# Patient Record
Sex: FEMALE | Race: WHITE | Employment: FULL TIME | URBAN - METROPOLITAN AREA
[De-identification: names, ages, dates, MRNs, and addresses within clinical notes are randomized per-mention and may not be internally consistent; named-entity substitution may affect disease eponyms.]

---

## 2017-03-30 ENCOUNTER — TELEPHONE (OUTPATIENT)
Dept: ONCOLOGY | Age: 57
End: 2017-03-30

## 2017-05-03 ENCOUNTER — HOSPITAL ENCOUNTER (OUTPATIENT)
Dept: MAMMOGRAPHY | Age: 57
Discharge: HOME | End: 2017-05-03
Attending: FAMILY MEDICINE
Payer: COMMERCIAL

## 2017-05-03 DIAGNOSIS — Z09 FOLLOW-UP EXAM, 3-6 MONTHS SINCE PREVIOUS EXAM: ICD-10-CM

## 2017-05-03 DIAGNOSIS — R92.8 ABNORMAL MAMMOGRAM: ICD-10-CM

## 2017-05-03 DIAGNOSIS — R92.8 FOLLOW-UP EXAMINATION OF ABNORMAL MAMMOGRAM: ICD-10-CM

## 2017-05-03 PROCEDURE — 76642 ULTRASOUND BREAST LIMITED: CPT

## 2017-05-03 PROCEDURE — 77061 BREAST TOMOSYNTHESIS UNI: CPT

## 2017-05-08 ENCOUNTER — TELEPHONE (OUTPATIENT)
Dept: FAMILY MEDICINE CLINIC | Age: 57
End: 2017-05-08

## 2017-05-08 NOTE — TELEPHONE ENCOUNTER
Call pt to advise her right breast 3D mammo and US remain stable. No changes.   FU for bilateral mammogram in Nov.

## 2017-05-12 ENCOUNTER — TELEPHONE (OUTPATIENT)
Dept: FAMILY MEDICINE CLINIC | Age: 57
End: 2017-05-12

## 2017-05-31 ENCOUNTER — OFFICE VISIT (OUTPATIENT)
Dept: FAMILY MEDICINE CLINIC | Age: 57
End: 2017-05-31

## 2017-05-31 VITALS
HEIGHT: 66 IN | TEMPERATURE: 98.2 F | WEIGHT: 148 LBS | HEART RATE: 63 BPM | BODY MASS INDEX: 23.78 KG/M2 | SYSTOLIC BLOOD PRESSURE: 91 MMHG | OXYGEN SATURATION: 98 % | RESPIRATION RATE: 20 BRPM | DIASTOLIC BLOOD PRESSURE: 56 MMHG

## 2017-05-31 DIAGNOSIS — Z11.59 NEED FOR HEPATITIS C SCREENING TEST: ICD-10-CM

## 2017-05-31 DIAGNOSIS — Z00.00 WELL WOMAN EXAM (NO GYNECOLOGICAL EXAM): Primary | ICD-10-CM

## 2017-05-31 DIAGNOSIS — Z12.11 SCREEN FOR COLON CANCER: ICD-10-CM

## 2017-05-31 NOTE — MR AVS SNAPSHOT
Visit Information Date & Time Provider Department Dept. Phone Encounter #  
 [id number]  1:08 PM Ginna AmadoSudhir [phone] [id number] Upcoming Health Maintenance Date Due Hepatitis C Screening 1960 FOBT Q 1 YEAR AGE 50-75 10/23/2012 INFLUENZA AGE 9 TO ADULT 8/1/2017 PAP AKA CERVICAL CYTOLOGY 2/23/2018 Pneumococcal 19-64 Highest Risk (2 of 3 - PCV13) 5/31/2018 BREAST CANCER SCRN MAMMOGRAM 5/3/2019 DTaP/Tdap/Td series (2 - Td) 4/15/2025 Allergies as of 5/31/2017  Review Complete On: 6/37/4536 By: Ginna Amado MD  
  
 Severity Noted Reaction Type Reactions Latex High 02/23/2015   Side Effect Rash Pcn [Penicillins] High 02/23/2015   Side Effect Rash Stadol [Butorphanol Tartrate] High 02/23/2015   Side Effect Other (comments) Sees things Sterapred [Prednisone] High 02/23/2015   Side Effect Other (comments) depression Sulfa (Sulfonamide Antibiotics) High 02/23/2015    Other (comments) septic Current Immunizations  Reviewed on 8/31/2016 No immunizations on file. Not reviewed this visit You Were Diagnosed With   
  
 Codes Comments Well woman exam (no gynecological exam)    -  Primary ICD-10-CM: Z00.00 ICD-9-CM: V70.0 [V70.0] Screen for colon cancer     ICD-10-CM: Z12.11 ICD-9-CM: V76.51 Need for hepatitis C screening test     ICD-10-CM: Z11.59 
ICD-9-CM: V73.89 Vitals BP Pulse Temp Resp Height(growth percentile) Weight(growth percentile) 91/56 (BP 1 Location: Right arm, BP Patient Position: Sitting) 63 98.2 °F (36.8 °C) (Oral) 20 5' 6\" (1.676 m) 148 lb (67.1 kg) SpO2 BMI OB Status Smoking Status 98% 23.89 kg/m2 Chemically Induced Never Smoker BMI and BSA Data Body Mass Index Body Surface Area  
 23.89 kg/m 2 1.77 m 2 Preferred Pharmacy Pharmacy Name Phone  [email or web address] LORRAINE AMARJIT, [address] [place] [address],[address] [place] [address] [phone] Your Updated Medication List  
  
   
This list is accurate as of: 5/31/17  5:11 PM.  Always use your most recent med list.  
  
  
  
  
 multivitamin tablet Commonly known as:  ONE A DAY Take 1 Tab by mouth daily. * OTHER Take  by mouth daily. Indications: EPAmax * OTHER Indications: Tucker-Mag Plus VITAMIN D3 4,000 unit Cap Generic drug:  cholecalciferol (vitamin D3) Take  by mouth. * Notice: This list has 2 medication(s) that are the same as other medications prescribed for you. Read the directions carefully, and ask your doctor or other care provider to review them with you. We Performed the Following HEMOGLOBIN A1C WITH EAG [95200 CPT(R)] HEPATITIS C AB [78763 CPT(R)] LIPID PANEL [68867 CPT(R)] OCCULT BLOOD, IMMUNOASSAY (FIT) W621546 CPT(R)] T4, FREE [id number] CPT(R)] TSH 3RD GENERATION [32499 CPT(R)] To-Do List   
 10/24/2017 4:30 PM  
(Arrive by 4:15 PM) Appointment with [facility] 1 at [facility] ([phone]) Shower or bathe using soap and water. Do not use deodorant, powder, perfumes, or lotion the day of your exam.  If your prior mammograms were not performed at [facility] 6 please bring films with you or forward prior images 2 days before your procedure. Check in at registration 15min before your appointment time unless you were instructed to do otherwise. A script is not necessary for screening. If you have one, please bring it on the day of the mammogram or have it faxed to the department. [facility]  [phone] [facility] [phone] [facility] [phone] [facility] [phone] Please arrive 15 minutes prior to appointment to register Patient Instructions Colon Cancer Screening: Care Instructions Your Care Instructions Colorectal cancer occurs in the colon or rectum. That's the lower part of your digestive system.  It is the second-leading cause of cancer deaths in the [place]. It often starts with small growths called polyps in the colon or rectum. Polyps are usually found with screening tests. Depending on the type of test, any polyps found may be removed during the tests. Colorectal cancer usually does not cause symptoms at first. But regular tests can help find it early, before it spreads and becomes harder to treat. Experts advise routine tests for colon cancer for people starting at age 48. And they advise people with a higher risk of colon cancer to get tested sooner. Talk with your doctor about when you should start testing. Discuss which tests you need. Follow-up care is a key part of your treatment and safety. Be sure to make and go to all appointments, and call your doctor if you are having problems. It's also a good idea to know your test results and keep a list of the medicines you take. What are the main screening tests for colon cancer? · Stool tests. These include the fecal immunochemical test (FIT) and the fecal occult blood test (FOBT). These tests check stool samples for signs of cancer. If your test is positive, you will need to have a colonoscopy. · Sigmoidoscopy. This test lets your doctor look at the lining of your rectum and the lowest part of your colon. Your doctor uses a lighted tube called a sigmoidoscope. This test can't find cancers or polyps in the upper part of your colon. In some cases, polyps that are found can be removed. But if your doctor finds polyps, you will need to have a colonoscopy to check the upper part of your colon. · Colonoscopy. This test lets your doctor look at the lining of your rectum and your entire colon. The doctor uses a thin, flexible tool called a colonoscope. It can also be used to remove polyps or get a tissue sample (biopsy). What tests do you need? The following guidelines are for people age 48 and over who are not at high risk for colorectal cancer.  You may have at least one of these tests as directed by your doctor. · Fecal immunochemical test (FIT) or fecal occult blood test (FOBT) every year · Sigmoidoscopy every 5 years · Colonoscopy every 10 years If you are age 76 and have had regular screenings or are age [de-identified] or older, you may not need screening. Talk with your doctor about when you need to be tested. And discuss which tests are right for you. Your doctor may recommend earlier or more frequent testing if you: 
· Have had colorectal cancer before. · Have had colon polyps. · Have symptoms of colorectal cancer. These include blood in your stool and changes in your bowel habits. · Have a parent, brother or sister, or child with colon polyps or colorectal cancer. · Have a bowel disease. This includes ulcerative colitis and Crohn's disease. · Have a rare polyp syndrome that runs in families, such as familial adenomatous polyposis (FAP). · Have had radiation treatments to the belly or pelvis. When should you call for help? Call your doctor now or seek immediate medical care if: 
· Your stools are black and tarlike or have streaks of blood. · You have pain or tenderness in your lower belly. Watch closely for changes in your health, and be sure to contact your doctor if: 
· You have diarrhea, constipation, or another change in bowel habits that does not get better. · Your stools are narrower than usual. 
· You lose weight and you don't know why. · You have any questions about your screening tests or schedule. Where can you learn more? Go to http://willa-madison.info/. Enter M541 in the search box to learn more about \"Colon Cancer Screening: Care Instructions. \" Current as of: July 26, 2016 Content Version: 11.2 © 5580-6472 [employer]. Care instructions adapted under license by [employer] (which disclaims liability or warranty for this information).  If you have questions about a medical condition or this instruction, always ask your healthcare professional. [facility] any warranty or liability for your use of this information. Introducing [facility]! Dev Culver introduces CleveX patient portal. Now you can access parts of your medical record, email your doctor's office, and request medication refills online. 1. In your internet browser, go to [email or web address] 2. Click on the First Time User? Click Here link in the Sign In box. You will see the New Member Sign Up page. 3. Enter your CleveX Access Code exactly as it appears below. You will not need to use this code after youve completed the sign-up process. If you do not sign up before the expiration date, you must request a new code. · CleveX Access Code: [email or web address] Expires: 7/27/2017 12:25 PM 
 
4. Enter the last four digits of your Social Security Number (xxxx) and Date of Birth (mm/dd/yyyy) as indicated and click Submit. You will be taken to the next sign-up page. 5. Create a CleveX ID. This will be your CleveX login ID and cannot be changed, so think of one that is secure and easy to remember. 6. Create a CleveX password. You can change your password at any time. 7. Enter your Password Reset Question and Answer. This can be used at a later time if you forget your password. 8. Enter your e-mail address. You will receive e-mail notification when new information is available in [address] 9. Click Sign Up. You can now view and download portions of your medical record. 10. Click the Download Summary menu link to download a portable copy of your medical information. If you have questions, please visit the Frequently Asked Questions section of the CleveX website. Remember, CleveX is NOT to be used for urgent needs. For medical emergencies, dial 911. Now available from your iPhone and Android! Please provide this summary of care documentation to your next provider. Your primary care clinician is listed as Francesca Pressley. If you have any questions after today's visit, please call [phone].

## 2017-05-31 NOTE — PATIENT INSTRUCTIONS
Colon Cancer Screening: Care Instructions  Your Care Instructions  Colorectal cancer occurs in the colon or rectum. That's the lower part of your digestive system. It is the second-leading cause of cancer deaths in the [place]. It often starts with small growths called polyps in the colon or rectum. Polyps are usually found with screening tests. Depending on the type of test, any polyps found may be removed during the tests. Colorectal cancer usually does not cause symptoms at first. But regular tests can help find it early, before it spreads and becomes harder to treat. Experts advise routine tests for colon cancer for people starting at age 48. And they advise people with a higher risk of colon cancer to get tested sooner. Talk with your doctor about when you should start testing. Discuss which tests you need. Follow-up care is a key part of your treatment and safety. Be sure to make and go to all appointments, and call your doctor if you are having problems. It's also a good idea to know your test results and keep a list of the medicines you take. What are the main screening tests for colon cancer? · Stool tests. These include the fecal immunochemical test (FIT) and the fecal occult blood test (FOBT). These tests check stool samples for signs of cancer. If your test is positive, you will need to have a colonoscopy. · Sigmoidoscopy. This test lets your doctor look at the lining of your rectum and the lowest part of your colon. Your doctor uses a lighted tube called a sigmoidoscope. This test can't find cancers or polyps in the upper part of your colon. In some cases, polyps that are found can be removed. But if your doctor finds polyps, you will need to have a colonoscopy to check the upper part of your colon. · Colonoscopy. This test lets your doctor look at the lining of your rectum and your entire colon. The doctor uses a thin, flexible tool called a colonoscope.  It can also be used to remove polyps or get a tissue sample (biopsy). What tests do you need? The following guidelines are for people age 48 and over who are not at high risk for colorectal cancer. You may have at least one of these tests as directed by your doctor. · Fecal immunochemical test (FIT) or fecal occult blood test (FOBT) every year  · Sigmoidoscopy every 5 years  · Colonoscopy every 10 years  If you are age 76 and have had regular screenings or are age [de-identified] or older, you may not need screening. Talk with your doctor about when you need to be tested. And discuss which tests are right for you. Your doctor may recommend earlier or more frequent testing if you:  · Have had colorectal cancer before. · Have had colon polyps. · Have symptoms of colorectal cancer. These include blood in your stool and changes in your bowel habits. · Have a parent, brother or sister, or child with colon polyps or colorectal cancer. · Have a bowel disease. This includes ulcerative colitis and Crohn's disease. · Have a rare polyp syndrome that runs in families, such as familial adenomatous polyposis (FAP). · Have had radiation treatments to the belly or pelvis. When should you call for help? Call your doctor now or seek immediate medical care if:  · Your stools are black and tarlike or have streaks of blood. · You have pain or tenderness in your lower belly. Watch closely for changes in your health, and be sure to contact your doctor if:  · You have diarrhea, constipation, or another change in bowel habits that does not get better. · Your stools are narrower than usual.  · You lose weight and you don't know why. · You have any questions about your screening tests or schedule. Where can you learn more? Go to http://willa-madison.info/. Enter M541 in the search box to learn more about \"Colon Cancer Screening: Care Instructions. \"  Current as of: July 26, 2016  Content Version: 11.2  © 6723-8538 [employer], [facility].  Care instructions adapted under license by [employer] (which disclaims liability or warranty for this information). If you have questions about a medical condition or this instruction, always ask your healthcare professional. [address] any warranty or liability for your use of this information.

## 2017-05-31 NOTE — PROGRESS NOTES
Subjective:   49 y.o. female for Well Woman Check. Her gyne and breast care is done elsewhere by her Ob-Gyne physician. Patient Active Problem List    Diagnosis Date Noted    Breast cancer, stage 2 ([facility] [address] 08/31/2016    History of lumpectomy of left breast 08/31/2016    Iritis, chronic 08/31/2016    Cancer ([facility]      Current Outpatient Prescriptions   Medication Sig Dispense Refill    multivitamin (ONE A DAY) tablet Take 1 Tab by mouth daily.  cholecalciferol, vitamin D3, (VITAMIN D3) 4,000 unit cap Take  by mouth.  OTHER Take  by mouth daily. Indications: EPAmax      OTHER Indications: Tucker-Mag Plus       Allergies   Allergen Reactions    Latex Rash    Pcn [Penicillins] Rash    Stadol [Butorphanol Tartrate] Other (comments)     Sees things    Sterapred [Prednisone] Other (comments)     depression    Sulfa (Sulfonamide Antibiotics) Other (comments)     septic     Past Medical History:   Diagnosis Date    Acute iritis of left eye     Breast cancer ([facility]     left idc 1+ node    Cancer ([facility] 2009    breast cancer     Past Surgical History:   Procedure Laterality Date    HX ACL RECONSTRUCTION Right 2002    HX BREAST LUMPECTOMY Left 2010    idc 1 + node, chemo only    HX COLONOSCOPY  2014    normal    [facility] BIOPSY BREAST STEREOTACTIC Left     idc 2010     Family History   Problem Relation Age of Onset    Cancer Mother      melanoma    Lupus Father      Social History   Substance Use Topics    Smoking status: Never Smoker    Smokeless tobacco: Never Used    Alcohol use No             Specific concerns today: pt had labs done ordered by ONCOLOGY. Up to date on mammogram. Last PAP 2015. Feeling well. Review of Systems  Pertinent items are noted in HPI. Objective:   Blood pressure 91/56, pulse 63, temperature 98.2 °F (36.8 °C), temperature source Oral, resp. rate 20, height 5' 6\" (1.676 m), weight 148 lb (67.1 kg), SpO2 98 %.    Physical Examination:   General appearance - alert, well appearing, and in no distress  Mental status - alert, oriented to person, place, and time  Eyes - pupils equal and reactive, extraocular eye movements intact  Ears - bilateral TM's and external ear canals normal  Nose - normal and patent, no erythema, discharge or polyps  Mouth - mucous membranes moist, pharynx normal without lesions  Neck - supple, no significant adenopathy  Chest - clear to auscultation, no wheezes, rales or rhonchi, symmetric air entry  Heart - normal rate, regular rhythm, normal S1, S2, no murmurs, rubs, clicks or gallops  Abdomen - soft, nontender, nondistended, no masses or organomegaly  Neurological - alert, oriented, normal speech, no focal findings or movement disorder noted  Extremities - peripheral pulses normal, no pedal edema, no clubbing or cyanosis     Assessment/Plan:   Well woman exam  increase physical activity, follow low fat diet, follow low salt diet, routine labs ordered    ICD-10-CM ICD-9-CM    1. Well woman exam (no gynecological exam) Z00.00 V70.0 LIPID PANEL      TSH 3RD GENERATION      T4, FREE      HEMOGLOBIN A1C WITH EAG    [V70.0]   2. Screen for colon cancer Z12.11 V76.51 OCCULT BLOOD, IMMUNOASSAY (FIT)   3. Need for hepatitis C screening test Z11.59 V73.89 HEPATITIS C AB     She plans to return for fasting labs after end of school year. Cont get regular exercise.

## 2017-05-31 NOTE — PROGRESS NOTES
Identified pt with two pt identifiers(name and ). Chief Complaint   Patient presents with    Physical        Health Maintenance Due   Topic    Hepatitis C Screening     FOBT Q 1 YEAR AGE 50-75        Wt Readings from Last 3 Encounters:   17 148 lb (67.1 kg)   16 145 lb 1.6 oz (65.8 kg)   02/23/15 157 lb (71.2 kg)     Temp Readings from Last 3 Encounters:   17 98.2 °F (36.8 °C) (Oral)   02/23/15 98.4 °F (36.9 °C) (Oral)     BP Readings from Last 3 Encounters:   17 91/56   16 105/68   02/23/15 102/64     Pulse Readings from Last 3 Encounters:   17 63   16 67   02/23/15 (!) 57         Learning Assessment:  :     Learning Assessment 2015   PRIMARY LEARNER Patient   HIGHEST LEVEL OF EDUCATION - PRIMARY LEARNER  > 4 YEARS OF COLLEGE   BARRIERS PRIMARY LEARNER NONE   CO-LEARNER CAREGIVER No   PRIMARY LANGUAGE ENGLISH   LEARNER PREFERENCE PRIMARY READING     LISTENING   ANSWERED BY self   RELATIONSHIP SELF       Depression Screening:  :     PHQ over the last two weeks 2017   Little interest or pleasure in doing things Not at all   Feeling down, depressed or hopeless Not at all   Total Score PHQ 2 0       Fall Risk Assessment:  :     Fall Risk Assessment, last 12 mths 2017   Able to walk? Yes   Fall in past 12 months? No       Abuse Screening:  :     Abuse Screening Questionnaire 2017   Do you ever feel afraid of your partner? N N   Are you in a relationship with someone who physically or mentally threatens you? N N   Is it safe for you to go home? Y Y       Coordination of Care Questionnaire:  :     1) Have you been to an emergency room, urgent care clinic since your last visit? no   Hospitalized since your last visit? no             2) Have you seen or consulted any other health care providers outside of [address] [place] [address] since your last visit?  yes  oncology (Include any pap smears or colon screenings in this section.)    3) Do you have an Advance Directive on file? no  Are you interested in receiving information about Advance Directives? no    Reviewed record in preparation for visit and have obtained necessary documentation. Medication reconciliation up to date and corrected with patient at this time.

## 2017-10-24 ENCOUNTER — HOSPITAL ENCOUNTER (OUTPATIENT)
Dept: MAMMOGRAPHY | Age: 57
Discharge: HOME | End: 2017-10-24
Attending: FAMILY MEDICINE
Payer: COMMERCIAL

## 2017-10-24 DIAGNOSIS — Z12.31 VISIT FOR SCREENING MAMMOGRAM: ICD-10-CM

## 2017-10-24 PROCEDURE — 77063 BREAST TOMOSYNTHESIS BI: CPT

## 2017-11-22 ENCOUNTER — OFFICE VISIT (OUTPATIENT)
Dept: ONCOLOGY | Age: 57
End: 2017-11-22

## 2017-11-22 VITALS
RESPIRATION RATE: 16 BRPM | SYSTOLIC BLOOD PRESSURE: 108 MMHG | BODY MASS INDEX: 22.43 KG/M2 | HEART RATE: 53 BPM | DIASTOLIC BLOOD PRESSURE: 69 MMHG | OXYGEN SATURATION: 98 % | WEIGHT: 139.6 LBS | TEMPERATURE: 98.4 F | HEIGHT: 66 IN

## 2017-11-22 DIAGNOSIS — Z98.890 HISTORY OF LUMPECTOMY OF LEFT BREAST: ICD-10-CM

## 2017-11-22 DIAGNOSIS — C50.912 MALIGNANT NEOPLASM OF LEFT BREAST, STAGE 2 (HCC): Primary | ICD-10-CM

## 2017-11-22 NOTE — PROGRESS NOTES
HEME/ONC CONSULT       Jarret Aragon is a 56 y.o. 1960 female and presents with Breast Cancer    CC  Breast cancer 2010    HPI  Pt seen today for over a year f/u breast cancer not on any therapy from here. Last seen 8/16 per pt preference. Has a rash with hive like lesions and pt is concerned about cancer recurrence. mammo 11/17 good. Pt did not do PCP labs because states she does not need that. Wants labs with tumor markers from here ordered. Pt states she has Sana and Masha awareness of left LNs\". No new issues today. Sees PCP regularly. Last visit:  office consult for ER neg UT+ HEr2 neg breast cancer in 2010 treated at [facility]. Pt had lumpectomy declined XRT and had TC x 6. Last mammo 11/14 and due for mammo. Had insurance issues which delayed. No breast MRI since surgery. Pt is healthy but has had chronic recurrent iritis. Pt associates this with cancer occurrence. Pt has autoimmune w/u neg. Pt is here to establish routine f/u. No cancer related issues today. Family hx of breast cancer in aunt. Mother melanoma. Father lupus. DX   Encounter Diagnoses   Name Primary?     Malignant neoplasm of left breast, stage 2 ([address] Yes    History of lumpectomy of left breast         STAGE: 2 ER neg/ UT + Her 2 neg    TREATMENT COURSE: lumpectomy declined XRT  [facility]        Past Medical History:   Diagnosis Date    Acute iritis of left eye     Breast cancer ([address]     left idc 1+ node    Cancer ([address] 2009    breast cancer     Past Surgical History:   Procedure Laterality Date    HX ACL RECONSTRUCTION Right 2002    HX BREAST LUMPECTOMY Left 2010    idc 1 + node, chemo only    HX COLONOSCOPY  2014    normal    LUIS ALBERTO BIOPSY BREAST STEREOTACTIC Left 2010    idc      Social History     Social History    Marital status:      Spouse name: N/A    Number of children: N/A    Years of education: N/A     Social History Main Topics    Smoking status: Never Smoker    Smokeless tobacco: Never Used    Alcohol use No    Drug use: No    Sexual activity: Yes     Partners: Male     Other Topics Concern    None     Social History Narrative     Family History   Problem Relation Age of Onset    Cancer Mother      melanoma    Lupus Father        Current Outpatient Prescriptions   Medication Sig Dispense Refill    multivitamin (ONE A DAY) tablet Take 1 Tab by mouth daily.  cholecalciferol, vitamin D3, (VITAMIN D3) 4,000 unit cap Take  by mouth.  OTHER Take  by mouth daily. Indications: EPAmax      OTHER Indications: Tucker-Mag Plus         Allergies   Allergen Reactions    Latex Rash    Pcn [Penicillins] Rash    Stadol [Butorphanol Tartrate] Other (comments)     Sees things    Sterapred [Prednisone] Other (comments)     depression    Sulfa (Sulfonamide Antibiotics) Other (comments)     septic       Review of Systems    A comprehensive review of systems was negative except for: per HPI       Objective:  Visit Vitals    /69    Pulse (!) 53    Temp 98.4 °F (36.9 °C) (Oral)    Resp 16    Ht 5' 6\" (1.676 m)    Wt 139 lb 9.6 oz (63.3 kg)    SpO2 98%    BMI 22.53 kg/m2         Physical Exam:   General appearance - alert, well appearing, and in no distress, oriented to person, place, and time and normal appearing weight  Mental status - alert, oriented to person, place, and time, normal mood, behavior, speech, dress, motor activity, and thought processes  EYE-conj clear  Mouth - mucous membranes moist, pharynx normal without lesions  Neck - supple, no significant adenopathy   Chest - clear to auscultation, no wheezes, rales or rhonchi, symmetric air entry   Heart - normal rate and regular rhythm   Abdomen - soft, nontender, nondistended, no masses or organomegaly  Lymph- no adenopathy palpable  Ext-no pedal edema noted  Skin-Warm and dry.    Neuro -alert, oriented, normal speech, no focal findings or movement disorder noted  Breast - no masses palapated b/l    Diagnostic Imaging   reviewed  No results found for this or any previous visit. No results found for this or any previous visit. Lab Results  reviewed  Lab Results   Component Value Date/Time    WBC 4.8 03/30/2017 12:00 AM    HGB 12.9 03/30/2017 12:00 AM    HCT 38.8 03/30/2017 12:00 AM    PLATELET 890 60/16/8719 12:00 AM    MCV 89 03/30/2017 12:00 AM       Lab Results   Component Value Date/Time    Sodium 141 03/30/2017 12:00 AM    Potassium 4.3 03/30/2017 12:00 AM    Chloride 99 03/30/2017 12:00 AM    CO2 28 03/30/2017 12:00 AM    Glucose 93 03/30/2017 12:00 AM    BUN 14 03/30/2017 12:00 AM    Creatinine 0.75 03/30/2017 12:00 AM    BUN/Creatinine ratio 19 03/30/2017 12:00 AM    GFR est  03/30/2017 12:00 AM    GFR est non-AA 91 03/30/2017 12:00 AM    Calcium 9.5 03/30/2017 12:00 AM    AST (SGOT) 27 03/30/2017 12:00 AM    Alk. phosphatase 54 03/30/2017 12:00 AM    Protein, total 7.2 03/30/2017 12:00 AM    Albumin 4.6 03/30/2017 12:00 AM    A-G Ratio 1.8 03/30/2017 12:00 AM    ALT (SGPT) 26 03/30/2017 12:00 AM       .    Assessment/Plan:    1. Stage 2 left breast cancer 2010 ER neg/ DC + Her2 neg s/p lump no radiation by choice and TC chemo x 6 at [facility]     No evidence of recurrent breast cancer throughout the years. Pt is not on any therapy from here. mammo good 11/7. Pt did not do PCP labs. Wants routine labs done here with tumor markers as she has had done in the past.   We again discussed difficulties with interpreting tumor markers in early stage breast cancer but pt had them done at [facility] in past.    Pt clinically is doing well overall with no cancer related issues today. Routine yearly fu here is fine. 2. Rash. Pt to see PCP for this. 3.. Question about pin worms in family member. Pt to call PCP about this. F/u here in a year. Call if questions.                ICD-10-CM ICD-9-CM 1. Malignant neoplasm of left breast, stage 2 (HCC) C50.912 174.9 CBC WITH AUTOMATED DIFF      METABOLIC PANEL, COMPREHENSIVE      CA 27.29      CA 15-3 (SERIAL MONITOR)      LUIS ALBERTO 3D RONN W MAMMO BI SCREENING INCL CAD   2. History of lumpectomy of left breast Z90.12 V45.89 CBC WITH AUTOMATED DIFF      METABOLIC PANEL, COMPREHENSIVE      CA 27.29      CA 15-3 (SERIAL MONITOR)      LUIS ALBERTO 3D RONN W MAMMO BI SCREENING INCL CAD       Current Outpatient Prescriptions   Medication Sig    multivitamin (ONE A DAY) tablet Take 1 Tab by mouth daily.  cholecalciferol, vitamin D3, (VITAMIN D3) 4,000 unit cap Take  by mouth.  OTHER Take  by mouth daily. Indications: EPAmax    OTHER Indications: Tucker-Mag Plus     No current facility-administered medications for this visit. continue present plan, routine labs ordered, call if any problems  There are no Patient Instructions on file for this visit. Follow-up Disposition:  Return in about 1 year (around 11/22/2018).     Melissa Aguero DO

## 2018-06-11 LAB
ALBUMIN SERPL-MCNC: 4.7 G/DL (ref 3.5–5.5)
ALBUMIN/GLOB SERPL: 2.4 {RATIO} (ref 1.2–2.2)
ALP SERPL-CCNC: 54 IU/L (ref 39–117)
ALT SERPL-CCNC: 24 IU/L (ref 0–32)
AST SERPL-CCNC: 31 IU/L (ref 0–40)
BASOPHILS # BLD AUTO: 0 X10E3/UL (ref 0–0.2)
BASOPHILS NFR BLD AUTO: 1 %
BILIRUB SERPL-MCNC: 0.6 MG/DL (ref 0–1.2)
BUN SERPL-MCNC: 17 MG/DL (ref 6–24)
BUN/CREAT SERPL: 20 (ref 9–23)
CALCIUM SERPL-MCNC: 9.4 MG/DL (ref 8.7–10.2)
CANCER AG15-3 SERPL-ACNC: 15.6 U/ML (ref 0–25)
CANCER AG27-29 SERPL-ACNC: 20.3 U/ML (ref 0–38.6)
CHLORIDE SERPL-SCNC: 101 MMOL/L (ref 96–106)
CO2 SERPL-SCNC: 26 MMOL/L (ref 18–29)
CREAT SERPL-MCNC: 0.85 MG/DL (ref 0.57–1)
EOSINOPHIL # BLD AUTO: 0.2 X10E3/UL (ref 0–0.4)
EOSINOPHIL NFR BLD AUTO: 3 %
ERYTHROCYTE [DISTWIDTH] IN BLOOD BY AUTOMATED COUNT: 13.8 % (ref 12.3–15.4)
GFR SERPLBLD CREATININE-BSD FMLA CKD-EPI: 77 ML/MIN/1.73
GFR SERPLBLD CREATININE-BSD FMLA CKD-EPI: 89 ML/MIN/1.73
GLOBULIN SER CALC-MCNC: 2 G/DL (ref 1.5–4.5)
GLUCOSE SERPL-MCNC: 84 MG/DL (ref 65–99)
HCT VFR BLD AUTO: 36.8 % (ref 34–46.6)
HGB BLD-MCNC: 12.4 G/DL (ref 11.1–15.9)
IMM GRANULOCYTES # BLD: 0 X10E3/UL (ref 0–0.1)
IMM GRANULOCYTES NFR BLD: 0 %
LYMPHOCYTES # BLD AUTO: 1.7 X10E3/UL (ref 0.7–3.1)
LYMPHOCYTES NFR BLD AUTO: 34 %
MCH RBC QN AUTO: 30.2 PG (ref 26.6–33)
MCHC RBC AUTO-ENTMCNC: 33.7 G/DL (ref 31.5–35.7)
MCV RBC AUTO: 90 FL (ref 79–97)
MONOCYTES # BLD AUTO: 0.5 X10E3/UL (ref 0.1–0.9)
MONOCYTES NFR BLD AUTO: 9 %
NEUTROPHILS # BLD AUTO: 2.6 X10E3/UL (ref 1.4–7)
NEUTROPHILS NFR BLD AUTO: 53 %
PLATELET # BLD AUTO: 277 X10E3/UL (ref 150–379)
POTASSIUM SERPL-SCNC: 4.1 MMOL/L (ref 3.5–5.2)
PROT SERPL-MCNC: 6.7 G/DL (ref 6–8.5)
RBC # BLD AUTO: 4.11 X10E6/UL (ref 3.77–5.28)
SODIUM SERPL-SCNC: 145 MMOL/L (ref 134–144)
WBC # BLD AUTO: 4.9 X10E3/UL (ref 3.4–10.8)

## 2018-06-19 ENCOUNTER — TELEPHONE (OUTPATIENT)
Dept: ONCOLOGY | Age: 58
End: 2018-06-19

## 2018-08-13 ENCOUNTER — OFFICE VISIT (OUTPATIENT)
Dept: FAMILY MEDICINE CLINIC | Age: 58
End: 2018-08-13

## 2018-08-13 VITALS
HEART RATE: 73 BPM | DIASTOLIC BLOOD PRESSURE: 62 MMHG | HEIGHT: 66 IN | WEIGHT: 143.4 LBS | OXYGEN SATURATION: 98 % | SYSTOLIC BLOOD PRESSURE: 106 MMHG | TEMPERATURE: 98.5 F | RESPIRATION RATE: 20 BRPM | BODY MASS INDEX: 23.05 KG/M2

## 2018-08-13 DIAGNOSIS — C50.912 MALIGNANT NEOPLASM OF LEFT BREAST, STAGE 2 (HCC): ICD-10-CM

## 2018-08-13 DIAGNOSIS — Z01.419 WELL WOMAN EXAM WITH ROUTINE GYNECOLOGICAL EXAM: Primary | ICD-10-CM

## 2018-08-13 DIAGNOSIS — R53.81 MALAISE AND FATIGUE: ICD-10-CM

## 2018-08-13 DIAGNOSIS — R07.89 CHEST WALL PAIN: ICD-10-CM

## 2018-08-13 DIAGNOSIS — Z12.4 SCREENING FOR MALIGNANT NEOPLASM OF CERVIX: ICD-10-CM

## 2018-08-13 DIAGNOSIS — Z12.11 SCREEN FOR COLON CANCER: ICD-10-CM

## 2018-08-13 DIAGNOSIS — Z11.59 ENCOUNTER FOR HEPATITIS C SCREENING TEST FOR LOW RISK PATIENT: ICD-10-CM

## 2018-08-13 DIAGNOSIS — Z92.21 HX ANTINEOPLASTIC CHEMOTHERAPY: ICD-10-CM

## 2018-08-13 DIAGNOSIS — E55.9 VITAMIN D DEFICIENCY: ICD-10-CM

## 2018-08-13 DIAGNOSIS — R53.83 MALAISE AND FATIGUE: ICD-10-CM

## 2018-08-13 NOTE — PROGRESS NOTES
Subjective:   56 y.o. female for Well Woman Check. She is postmenopausal.  Social History: single partner, contraception - post menopausal status. Pertinent past medical hstory: no history of HTN, DVT, CAD, DM, liver disease, migraines or smoking. Patient Active Problem List    Diagnosis Date Noted    Breast cancer, stage 2 ([facility] 08/31/2016    History of lumpectomy of left breast 08/31/2016    Iritis, chronic 08/31/2016    Cancer ([facility]      Current Outpatient Prescriptions   Medication Sig Dispense Refill    multivitamin (ONE A DAY) tablet Take 1 Tab by mouth daily.  cholecalciferol, vitamin D3, (VITAMIN D3) 4,000 unit cap Take  by mouth.  OTHER Take  by mouth daily. Indications: EPAmax - fish oil      OTHER Indications: Tucker-Mag Plus       Allergies   Allergen Reactions    Latex Rash    Pcn [Penicillins] Rash    Stadol [Butorphanol Tartrate] Other (comments)     Sees things    Sterapred [Prednisone] Other (comments)     depression    Sulfa (Sulfonamide Antibiotics) Other (comments)     septic     Past Medical History:   Diagnosis Date    Acute iritis of left eye     Breast cancer ([facility]     left idc 1+ node    Cancer ([facility] 2009    breast cancer     Past Surgical History:   Procedure Laterality Date    HX ACL RECONSTRUCTION Right 2002    HX BREAST LUMPECTOMY Left 2010    idc 1 + node, chemo only    HX COLONOSCOPY  2014    normal    LUIS ALBERTO BIOPSY BREAST STEREOTACTIC Left 2010    idc      Family History   Problem Relation Age of Onset    Cancer Mother      melanoma    Lupus Father      Social History   Substance Use Topics    Smoking status: Never Smoker    Smokeless tobacco: Never Used    Alcohol use No        ROS:  C/O easy fatigue. Some dyspnea with exertion. SOA by end of day. Has had localized discomfort L rib cage. Cramps when she bends over. No change in bowel habits, black or bloody stools. No urinary tract symptoms.  GYN ROS: no breast pain or new or enlarging lumps on self exam, no vaginal bleeding, no discharge or pelvic pain. Menopausal symptoms: none. No neurological complaints. Last DEXA scan and T-score: none  Last Colonoscopy: 5 years ago  Last Mammogram: 2017    Objective:     Visit Vitals    /62 (BP 1 Location: Right arm, BP Patient Position: Sitting)  Comment: manual    Pulse 73    Temp 98.5 °F (36.9 °C) (Oral)    Resp 20    Ht 5' 6\" (1.676 m)    Wt 143 lb 6.4 oz (65 kg)    SpO2 98%    BMI 23.15 kg/m2     The patient appears well, alert, oriented x 3, in no distress. ENT normal.  Neck supple. No adenopathy or thyromegaly. VERONICA. Lungs are clear, good air entry, no wheezes, rhonchi or rales. S1 and S2 normal, no murmurs, regular rate and rhythm. Abdomen soft without tenderness, guarding, mass or organomegaly. Extremities show no edema, normal peripheral pulses. Neurological is normal, no focal findings. BREAST EXAM: left post-mastectomy site well healed and free of suspicious changes    PELVIC EXAM: normal external genitalia, vulva, vagina, cervix, uterus and adnexa, PAP: Pap smear done today    Assessment/Plan:   well woman  postmenopausal  mammogram  pap smear  additional lab tests per orders  return annually or prn    ICD-10-CM ICD-9-CM    1. Well woman exam with routine gynecological exam Z01.419 V72.31 LIPID PANEL      HEMOGLOBIN A1C WITH EAG      TSH 3RD GENERATION      T4, FREE    [V72.31]   2. Screening for malignant neoplasm of cervix Z12.4 V76.2 PAP IG, HPV AND RFX HPV 89/40,83(878068)   3. Screen for colon cancer Z12.11 V76.51    4. Encounter for hepatitis C screening test for low risk patient Z11.59 V73.89 HEPATITIS C AB   5. Vitamin D deficiency E55.9 268.9 VITAMIN D, 25 HYDROXY   6. Malaise and fatigue R53.81 780.79 2D ECHO LIMTED ADULT W OR WO CONTR    R53.83     7. Malignant neoplasm of left breast, stage 2 (HCC) C50.912 174.9    8. Hx antineoplastic chemotherapy Z92.21 V87.41 2D ECHO LIMTED ADULT W OR WO CONTR   9.  Chest wall pain R07.89 786.52 XR RIBS LT W PA CXR MIN 3 V   .  Labs ordered. PAP done. Pt request ECHO due to hx chemo. XR ribs.

## 2018-08-13 NOTE — MR AVS SNAPSHOT
[address] [place] [address] 
 
 
 [address]  [address] [facility] 
[phone] Patient: Edgardo Record MRN: [id number] :1960 Visit Information Date & Time Provider Department Dept. Phone Encounter #  
   1:59 PM Sudhir Saeed [phone] Upcoming Health Maintenance Date Due Hepatitis C Screening 1960 FOBT Q 1 YEAR AGE 50-75 10/23/2012 Pneumococcal 19-64 Highest Risk (2 of 3 - PCV13) 2018 Influenza Age 5 to Adult 2018 BREAST CANCER SCRN MAMMOGRAM 10/24/2019 PAP AKA CERVICAL CYTOLOGY 2021 DTaP/Tdap/Td series (2 - Td) 4/15/2025 Allergies as of 2018  Review Complete On:  By: Radha Rodney MD  
  
 Severity Noted Reaction Type Reactions Latex High 2015   Side Effect Rash Pcn [Penicillins] High 2015   Side Effect Rash Stadol [Butorphanol Tartrate] High 2015   Side Effect Other (comments) Sees things Sterapred [Prednisone] High 2015   Side Effect Other (comments) depression Sulfa (Sulfonamide Antibiotics) High 2015    Other (comments) septic Current Immunizations  Reviewed on 2017 No immunizations on file. Not reviewed this visit You Were Diagnosed With   
  
 Codes Comments Well woman exam with routine gynecological exam    -  Primary ICD-10-CM: Q05.489 ICD-9-CM: V72.31 [V72.31] Screening for malignant neoplasm of cervix     ICD-10-CM: Z12.4 ICD-9-CM: V76.2 Screen for colon cancer     ICD-10-CM: Z12.11 ICD-9-CM: V76.51 Encounter for hepatitis C screening test for low risk patient     ICD-10-CM: Z11.59 
ICD-9-CM: V73.89 Vitamin D deficiency     ICD-10-CM: E55.9 ICD-9-CM: 268.9 Malaise and fatigue     ICD-10-CM: R53.81, R53.83 ICD-9-CM: 780.79 Malignant neoplasm of left breast, stage 2 ([address]     ICD-10-CM: B86.000 ICD-9-CM: 174.9 Hx antineoplastic chemotherapy     ICD-10-CM: Z92.21 
ICD-9-CM: V87.41 Chest wall pain     ICD-10-CM: R07.89 ICD-9-CM: 786.52 Vitals BP Pulse Temp Resp Height(growth percentile) Weight(growth percentile) 106/62 (BP 1 Location: Right arm, BP Patient Position: Sitting) 73 98.5 °F (36.9 °C) (Oral) 20 5' 6\" (1.676 m) 143 lb 6.4 oz (65 kg) SpO2 BMI OB Status Smoking Status 98% 23.15 kg/m2 Chemically Induced Never Smoker Vitals History BMI and BSA Data Body Mass Index Body Surface Area  
 23.15 kg/m 2 1.74 m 2 Preferred Pharmacy Pharmacy Name Phone [facility] [address] [facility], [address] [place] [address],[facility] Floor [phone] Your Updated Medication List  
  
   
This list is accurate as of 8/13/18  2:56 PM.  Always use your most recent med list.  
  
  
  
  
 multivitamin tablet Commonly known as:  ONE A DAY Take 1 Tab by mouth daily. OTHER Take  by mouth daily. Indications: EPAmax - fish oil OTHER Indications: Tucker-Mag Plus VITAMIN D3 4,000 unit Cap Generic drug:  cholecalciferol (vitamin D3) Take  by mouth. We Performed the Following HEMOGLOBIN A1C WITH EAG [13247 CPT(R)] HEPATITIS C AB [34197 CPT(R)] LIPID PANEL [14516 CPT(R)] PAP IG, HPV AND RFX HPV 59/10,87(168508) [[id number] Custom] T4, FREE [id number] CPT(R)] TSH 3RD GENERATION [98722 CPT(R)] VITAMIN D, 25 HYDROXY [id number] CPT(R)] To-Do List   
 08/13/2018 ECHO:  2D ECHO LIMTED ADULT W OR WO CONTR   
  
 08/13/2018 Imaging:  XR RIBS LT W PA CXR MIN 3 V   
  
 10/30/2018 4:00 PM  
  Appointment with [facility] ALBERTO 2 at [address] [place] [address] ([phone]) Shower or bathe using soap and water.   Do not use deodorant, powder, perfumes, or lotion the day of your exam.  If your prior mammograms were not performed at [facility] please bring films with you or forward prior images 2 days before your procedure. Check in at registration 15min before your appointment time unless you were instructed to do otherwise. A script is not necessary for screening. If you have one, please bring it on the day of the mammogram or have it faxed to the department. [address] [place] [address]  [phone] [facility] [phone] [facility] [phone] [facility] [phone] Introducing [facility]! Elly Alexander introduces [employer] patient portal. Now you can access parts of your medical record, email your doctor's office, and request medication refills online. 1. In your internet browser, go to [email or web address] 2. Click on the First Time User? Click Here link in the Sign In box. You will see the New Member Sign Up page. 3. Enter your [employer] Access Code exactly as it appears below. You will not need to use this code after youve completed the sign-up process. If you do not sign up before the expiration date, you must request a new code. · le[employer]ock Access Code: [email or web address] Expires: 11/11/2018  2:31 PM 
 
4. Enter the last four digits of your Social Security Number (xxxx) and Date of Birth (mm/dd/yyyy) as indicated and click Submit. You will be taken to the next sign-up page. 5. Create a leaselock ID. This will be your leaselock login ID and cannot be changed, so think of one that is secure and easy to remember. 6. Create a le[employer]elock password. You can change your password at any time. 7. Enter your Password Reset Question and Answer. This can be used at a later time if you forget your password. 8. Enter your e-mail address. You will receive e-mail notification when new information is available in [address] 9. Click Sign Up. You can now view and download portions of your medical record. 10. Click the Download Summary menu link to download a portable copy of your medical information.  
 
If you have questions, please visit the Frequently Asked Questions section of the [employer]. Remember, [employer] is NOT to be used for urgent needs. For medical emergencies, dial 911. Now available from your iPhone and Android! Please provide this summary of care documentation to your next provider. Your primary care clinician is listed as Sadie Navarrete. If you have any questions after today's visit, please call [phone].

## 2018-08-13 NOTE — PROGRESS NOTES
Identified pt with two pt identifiers(name and ). Chief Complaint   Patient presents with    Well Woman    Breathing Problem     she said since chemo - she would like a ECHO     Chemotherapy     she has follow up in [facility]     please add vitamin D        Health Maintenance Due   Topic    Hepatitis C Screening     FOBT Q 1 YEAR AGE 50-75     PAP AKA CERVICAL CYTOLOGY     Pneumococcal 19-64 Highest Risk (2 of 3 - PCV13)    Influenza Age 5 to Adult        Wt Readings from Last 3 Encounters:   18 143 lb 6.4 oz (65 kg)   17 139 lb 9.6 oz (63.3 kg)   17 148 lb (67.1 kg)     Temp Readings from Last 3 Encounters:   18 98.5 °F (36.9 °C) (Oral)   17 98.4 °F (36.9 °C) (Oral)   17 98.2 °F (36.8 °C) (Oral)     BP Readings from Last 3 Encounters:   18 106/62   17 108/69   17 91/56     Pulse Readings from Last 3 Encounters:   18 73   17 (!) 53   17 63         Learning Assessment:  :     Learning Assessment 2015   PRIMARY LEARNER Patient   HIGHEST LEVEL OF EDUCATION - PRIMARY LEARNER  > 4 YEARS OF COLLEGE   BARRIERS PRIMARY LEARNER NONE   CO-LEARNER CAREGIVER No   PRIMARY LANGUAGE ENGLISH   LEARNER PREFERENCE PRIMARY READING     LISTENING   ANSWERED BY self   RELATIONSHIP SELF       Depression Screening:  :     PHQ over the last two weeks 2017   Little interest or pleasure in doing things Not at all   Feeling down, depressed, irritable, or hopeless Not at all   Total Score PHQ 2 0       Fall Risk Assessment:  :     Fall Risk Assessment, last 12 mths 2017   Able to walk? Yes   Fall in past 12 months? No       Abuse Screening:  :     Abuse Screening Questionnaire 2017   Do you ever feel afraid of your partner? N N   Are you in a relationship with someone who physically or mentally threatens you? N N   Is it safe for you to go home?  Y Y       Coordination of Care Questionnaire:  :     1) Have you been to an emergency room, urgent care clinic since your last visit? no   Hospitalized since your last visit? no             2) Have you seen or consulted any other health care providers outside of [address] [place] [address] since your last visit? yes  Ortho for left shoulder (Include any pap smears or colon screenings in this section.)    3) Do you have an Advance Directive on file? no  Are you interested in receiving information about Advance Directives? no    Reviewed record in preparation for visit and have obtained necessary documentation. Medication reconciliation up to date and corrected with patient at this time.

## 2018-08-17 ENCOUNTER — TELEPHONE (OUTPATIENT)
Dept: FAMILY MEDICINE CLINIC | Age: 58
End: 2018-08-17

## 2018-08-17 LAB
CYTOLOGIST CVX/VAG CYTO: NORMAL
CYTOLOGY CVX/VAG DOC THIN PREP: NORMAL
DX ICD CODE: NORMAL
HPV I/H RISK 1 DNA CVX QL PROBE+SIG AMP: NORMAL
HPV I/H RISK 4 DNA CVX QL PROBE+SIG AMP: NEGATIVE
Lab: NORMAL
OTHER STN SPEC: NORMAL
PATH REPORT.FINAL DX SPEC: NORMAL
STAT OF ADQ CVX/VAG CYTO-IMP: NORMAL

## 2018-08-17 NOTE — TELEPHONE ENCOUNTER
Please call pt to advise her rib XR came back NORMAL. Also ECHO report showed normal heart function.

## 2018-10-18 DIAGNOSIS — R07.89 CHEST WALL PAIN: ICD-10-CM

## 2018-10-19 DIAGNOSIS — R53.83 MALAISE AND FATIGUE: ICD-10-CM

## 2018-10-19 DIAGNOSIS — R53.81 MALAISE AND FATIGUE: ICD-10-CM

## 2018-10-19 DIAGNOSIS — Z92.21 HX ANTINEOPLASTIC CHEMOTHERAPY: ICD-10-CM

## 2018-12-07 ENCOUNTER — HOSPITAL ENCOUNTER (OUTPATIENT)
Dept: MAMMOGRAPHY | Age: 58
Discharge: HOME | End: 2018-12-07
Attending: INTERNAL MEDICINE
Payer: COMMERCIAL

## 2018-12-07 DIAGNOSIS — Z98.890 HISTORY OF LUMPECTOMY OF LEFT BREAST: ICD-10-CM

## 2018-12-07 DIAGNOSIS — C50.912 MALIGNANT NEOPLASM OF LEFT BREAST, STAGE 2 (HCC): ICD-10-CM

## 2018-12-07 PROCEDURE — 77063 BREAST TOMOSYNTHESIS BI: CPT

## 2019-07-08 ENCOUNTER — OFFICE VISIT (OUTPATIENT)
Dept: ONCOLOGY | Age: 59
End: 2019-07-08

## 2019-07-08 ENCOUNTER — DOCUMENTATION ONLY (OUTPATIENT)
Dept: ONCOLOGY | Age: 59
End: 2019-07-08

## 2019-07-08 VITALS
OXYGEN SATURATION: 100 % | BODY MASS INDEX: 23.27 KG/M2 | WEIGHT: 144.8 LBS | DIASTOLIC BLOOD PRESSURE: 76 MMHG | HEIGHT: 66 IN | SYSTOLIC BLOOD PRESSURE: 113 MMHG | HEART RATE: 62 BPM | TEMPERATURE: 98.6 F

## 2019-07-08 DIAGNOSIS — Z98.890 HISTORY OF LUMPECTOMY OF LEFT BREAST: ICD-10-CM

## 2019-07-08 DIAGNOSIS — Z85.3 HISTORY OF BREAST CANCER: Primary | ICD-10-CM

## 2019-07-08 NOTE — PROGRESS NOTES
Lexie Lopez is a 66 y.o. female  Chief Complaint   Patient presents with    Follow-up     Breast Cancer     1. Have you been to the ER, urgent care clinic since your last visit? Hospitalized since your last visit? No, this is Pt first time here. 2. Have you seen or consulted any other health care providers outside of the [address] [place] [address] since your last visit? Include any pap smears or colon screening. No, this is Pt first time here.

## 2019-07-08 NOTE — LETTER
7/8/19 Patient: Nishi Goode YOB: 1960 Date of Visit: 7/8/2019 Vaughn Jett MD 
[address] [facility] [phone] VIA In Basket Dear Vaughn Jett MD, Thank you for referring Ms. Nishi Goode to [address] [place] [address] for evaluation. My notes for this consultation are attached. If you have questions, please do not hesitate to call me. I look forward to following your patient along with you. Sincerely, Thomas Mann, DO

## 2019-07-08 NOTE — PROGRESS NOTES
HEME/ONC CONSULT       Jenn Pablo is a 66 y.o. 1960 female and presents with Follow-up (Breast Cancer)    CC  Breast cancer 2010    HPI  Pt seen today for f/u breast cancer ER+ HER2 negative treated at [facility] not on any therapy from here. Last seen here 2017 per pt preference. Pt states she is here due to possible prominent left axillary LNs. Last mammo 12/18 good. Sees PCP yearly. Pt is a  and wants to get involved in \"cancer research\". Feels healthy overall. Last visit:                                                                                            Last seen 8/16 per pt preference. Has a rash with hive like lesions and pt is concerned about cancer recurrence. mammo 11/17 good. Pt did not do PCP labs because states she does not need that. Wants labs with tumor markers from here ordered. Pt states she has [place] awareness of left LNs\". No new issues today. Sees PCP regularly. Last visit:  office consult for ER neg MO+ HEr2 neg breast cancer in 2010 treated at [facility]. Pt had lumpectomy declined XRT and had TC x 6. Last mammo 11/14 and due for mammo. Had insurance issues which delayed. No breast MRI since surgery. Pt is healthy but has had chronic recurrent iritis. Pt associates this with cancer occurrence. Pt has autoimmune w/u neg. Pt is here to establish routine f/u. No cancer related issues today. Family hx of breast cancer in aunt. Mother melanoma. Father lupus. DX   Encounter Diagnoses   Name Primary?     History of breast cancer Yes    History of lumpectomy of left breast         STAGE: 2 ER neg/ MO + Her 2 neg    TREATMENT COURSE: lumpectomy declined XRT  TC x 6 at [facility]        Past Medical History:   Diagnosis Date    Acute iritis of left eye     Breast cancer ([address]     left idc 1+ node    Cancer [facility] 2009    breast cancer     Past Surgical History:   Procedure Laterality Date    HX ACL RECONSTRUCTION Right 2002    HX BREAST LUMPECTOMY Left 2010    idc 1 + node, chemo only    HX COLONOSCOPY  2014    normal    LUIS ALBERTO BIOPSY BREAST STEREOTACTIC Left 2010    idc      Social History     Socioeconomic History    Marital status:      Spouse name: Not on file    Number of children: Not on file    Years of education: Not on file    Highest education level: Not on file   Tobacco Use    Smoking status: Never Smoker    Smokeless tobacco: Never Used   Substance and Sexual Activity    Alcohol use: No    Drug use: No    Sexual activity: Yes     Partners: Male     Family History   Problem Relation Age of Onset    Cancer Mother         melanoma    Lupus Father        Current Outpatient Medications   Medication Sig Dispense Refill    multivitamin (ONE A DAY) tablet Take 1 Tab by mouth daily.  OTHER Take  by mouth daily. Indications: EPAmax - fish oil      OTHER Indications: Tucker-Mag Plus      cholecalciferol, vitamin D3, (VITAMIN D3) 4,000 unit cap Take  by mouth.          Allergies   Allergen Reactions    Latex Rash    Pcn [Penicillins] Rash    Stadol [Butorphanol Tartrate] Other (comments)     Sees things    Sterapred [Prednisone] Other (comments)     depression    Sulfa (Sulfonamide Antibiotics) Other (comments)     septic       Review of Systems    A comprehensive review of systems was negative except for: per HPI       Objective:  Visit Vitals  /76 (BP 1 Location: Right arm, BP Patient Position: Sitting)   Pulse 62   Temp 98.6 °F (37 °C) (Oral)   Ht 5' 6\" (1.676 m)   Wt 144 lb 12.8 oz (65.7 kg)   SpO2 100%   BMI 23.37 kg/m²         Physical Exam:   General appearance - alert, well appearing, and in no distress  Mental status - alert, oriented to person, place, and time, normal mood, behavior, speech, dress, motor activity, and thought processes  EYE-conj clear  Mouth - mucous membranes moist, pharynx normal without lesions  Neck - supple, no significant adenopathy   Chest - clear to auscultation, no wheezes, rales or rhonchi, symmetric air entry   Heart - normal rate and regular rhythm   Abdomen - soft, nontender, nondistended, no masses or organomegaly  Lymph- no adenopathy palpable  Ext-no pedal edema noted  Skin-Warm and dry. Neuro -alert, oriented, normal speech, no focal findings or movement disorder noted  Breast - no masses palpated b/l    Diagnostic Imaging   reviewed  Results for orders placed in visit on 10/18/18   XR RIBS LT W PA CXR MIN 3 V       No results found for this or any previous visit. Lab Results  reviewed  Lab Results   Component Value Date/Time    WBC 4.9 06/08/2018 04:11 PM    HGB 12.4 06/08/2018 04:11 PM    HCT 36.8 06/08/2018 04:11 PM    PLATELET 182 01/81/5407 04:11 PM    MCV 90 06/08/2018 04:11 PM       Lab Results   Component Value Date/Time    Sodium 145 (H) 06/08/2018 04:11 PM    Potassium 4.1 06/08/2018 04:11 PM    Chloride 101 06/08/2018 04:11 PM    CO2 26 06/08/2018 04:11 PM    Glucose 84 06/08/2018 04:11 PM    BUN 17 06/08/2018 04:11 PM    Creatinine 0.85 06/08/2018 04:11 PM    BUN/Creatinine ratio 20 06/08/2018 04:11 PM    GFR est AA 89 06/08/2018 04:11 PM    GFR est non-AA 77 06/08/2018 04:11 PM    Calcium 9.4 06/08/2018 04:11 PM    AST (SGOT) 31 06/08/2018 04:11 PM    Alk. phosphatase 54 06/08/2018 04:11 PM    Protein, total 6.7 06/08/2018 04:11 PM    Albumin 4.7 06/08/2018 04:11 PM    A-G Ratio 2.4 (H) 06/08/2018 04:11 PM    ALT (SGPT) 24 06/08/2018 04:11 PM       .    Assessment/Plan:    1. Stage 2 left breast cancer 2010 ER neg/ [place] + Her2 neg hx of lump no radiation by choice and TC chemo x 6 at [facility]   No evidence of recurrent breast cancer throughout the years. Pt is not on any therapy from here. mammo good 11/18. Labs with PCP. Pt feels ? Chronic changes in left axilla at scar. Exam negative. Offered breast MRI and pt will consider and let us know if she wants to do this. Pt clinically is doing well overall.    Pt is almost 10 years out and will f/u here prn.     2.. Psychosocial.  Mood good. SW support. Is a . SW support today. F/u here prn   Call if questions. ICD-10-CM ICD-9-CM    1. History of breast cancer Z85.3 V10.3 REFERRAL TO SOCIAL WORK   2. History of lumpectomy of left breast Z98.890 V45.89 REFERRAL TO SOCIAL WORK       Current Outpatient Medications   Medication Sig    multivitamin (ONE A DAY) tablet Take 1 Tab by mouth daily.  OTHER Take  by mouth daily. Indications: EPAmax - fish oil    OTHER Indications: Tucker-Mag Plus    cholecalciferol, vitamin D3, (VITAMIN D3) 4,000 unit cap Take  by mouth. No current facility-administered medications for this visit. continue present plan, routine labs ordered, call if any problems  There are no Patient Instructions on file for this visit.        Vu Aleman, DO

## 2019-07-08 NOTE — PROGRESS NOTES
This note will not be viewable in [address] Oncology Navigator  Psychosocial Assessment    Reason for Assessment:    []Depression  []Anxiety  []Caregiver [place]  []Maladaptive Coping with Serious Illness   [] Social Work Referral [x] Initial Assessment  [] Other     Sources of Information:    [x]Patient  []Family  []Staff  []Medical Record    Advance Care Planning:  No flowsheet data found. Patient does not have an advanced medical directive, and did not express interest in completing one today.     Mental Status:    [x]Alert  []Lethargic  []Unresponsive   [] Unable to assess   Oriented to:  [x]Person  [x]Place  [x]Time  [x]Situation      Barriers to Learning:    []Language  []Developmental  []Cognitive  []Altered Mental Status  []Visual/Hearing Impairment  []Unable to Read/Write  []Motivational   [x]No Barriers Identified  []Other:    Relationship Status:  []Single  [x]  []Significant Other/Life Partner  []  []  []      Living Circumstances:  []Lives Alone  [x]Family/Significant Other in Household  []Roommates  []Children in the Home  []Paid Caregivers  []Assisted Living Facility/Group Home  []Skilled [address]  []Homeless  []Incarcerated  []Environmental/Care Concerns  []other:    Employment Status:  [x]Employed Full-time []Employed Part-time []Homemaker [] Disabled  [] Retired []Other:    Support System:    [x]Strong  []Fair  []Limited    Financial/Legal Concerns:    []Uninsured  []Limited Income/Resources  []Non-Citizen  [x]No Concerns Identified  []Financial POA:    []Other:    [place]ism/Spiritual/Existential:  []Strong Sense of Spirituality  []Involved in [employer]  []Request  Visit  []Expressing Krystin Bangura  [x]No Concerns Identified    Coping with Illness:         Patient: Family/Caregiver:   Understanding and Acceptance of Illness/Prognosis  [x] []   Strong Sense of Resilience [x] []   Self Reflection [x] []   Engaged Support System [x] []   Does not Readily Discuss Illness [] []   Denial of Terminal Status [] []   Anger [] []   Depression [] []   Anxiety/Fear [] []   Bargaining [] []   Recent Diagnosis/Prognosis [] []   Difficulties with Body Image [] []   Loss of Identity [] []   Excessive Substance Use [] []   Mental Health History [] []   Enmeshed Relationships [] []   History of Loss [] []   Anticipatory Grief [] []   Concern for Complicated Grief [] []   Suicidal Ideation or Plan [] []   Unable to assess [] []            Narrative:   Met with the patient during her office visit today. Patient has a history of breast cancer in 2010. Patient has a PCP - Dr. Mack Omer. The patient lives with her . She has a son and a daughter who live in [facility]. The patient has a son who lives in [place]. She works at [id number] as a . She has degrees in math and computer science and she asked if this  is aware of any volunteer positions related to cancer research. This  directed her towards Jaylin Lu and the [facility] Printed an [facility] volunteer opportunity guide as well. Provided this 's contact information and offered to assist with any needs that may arise. Assessment/Action:   1. Introduced self and role of this  in the [facility]. 2. Ongoing psychosocial support as desired by patient.       Plan/Referral:      Other referral (Volunteer opportunities)      Deo Juan LCSW

## 2019-08-19 ENCOUNTER — OFFICE VISIT (OUTPATIENT)
Dept: FAMILY MEDICINE CLINIC | Age: 59
End: 2019-08-19

## 2019-08-19 VITALS
DIASTOLIC BLOOD PRESSURE: 60 MMHG | WEIGHT: 144.6 LBS | BODY MASS INDEX: 23.24 KG/M2 | OXYGEN SATURATION: 98 % | RESPIRATION RATE: 20 BRPM | TEMPERATURE: 98.4 F | SYSTOLIC BLOOD PRESSURE: 90 MMHG | HEIGHT: 66 IN | HEART RATE: 54 BPM

## 2019-08-19 DIAGNOSIS — E53.8 VITAMIN B 12 DEFICIENCY: ICD-10-CM

## 2019-08-19 DIAGNOSIS — H20.10 IRITIS, CHRONIC: ICD-10-CM

## 2019-08-19 DIAGNOSIS — Z00.00 WELL WOMAN EXAM (NO GYNECOLOGICAL EXAM): ICD-10-CM

## 2019-08-19 DIAGNOSIS — Z00.00 ROUTINE ADULT HEALTH MAINTENANCE: Primary | ICD-10-CM

## 2019-08-19 DIAGNOSIS — L71.9 ROSACEA: ICD-10-CM

## 2019-08-19 DIAGNOSIS — E55.9 VITAMIN D DEFICIENCY: ICD-10-CM

## 2019-08-19 DIAGNOSIS — Z11.59 ENCOUNTER FOR HEPATITIS C SCREENING TEST FOR LOW RISK PATIENT: ICD-10-CM

## 2019-08-19 DIAGNOSIS — R40.0 DAYTIME SOMNOLENCE: ICD-10-CM

## 2019-08-19 PROBLEM — Z15.89 HLA B27 POSITIVE: Status: ACTIVE | Noted: 2019-08-19

## 2019-08-19 RX ORDER — METRONIDAZOLE 7.5 MG/G
GEL TOPICAL 2 TIMES DAILY
Qty: 60 G | Refills: 0 | Status: SHIPPED | OUTPATIENT
Start: 2019-08-19 | End: 2019-10-04 | Stop reason: SDUPTHER

## 2019-08-19 NOTE — PROGRESS NOTES
Subjective:   66 y.o. female for Well Woman Check. Her gyne and breast care is done elsewhere by her Ob-Gyne physician. 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 60 g 0    multivitamin (ONE A DAY) tablet Take 1 Tab by mouth daily.  cholecalciferol, vitamin D3, (VITAMIN D3) 4,000 unit cap Take  by mouth.  OTHER Take  by mouth daily. Indications: EPAmax - fish oil      OTHER Indications: Tucker-Mag Plus       Allergies   Allergen Reactions    Latex Rash    Pcn [Penicillins] Rash    Stadol [Butorphanol Tartrate] Other (comments)     Sees things    Sterapred [Prednisone] Other (comments)     depression    Sulfa (Sulfonamide Antibiotics) Other (comments)     septic     Past Medical History:   Diagnosis Date    Acute iritis of left eye     Breast cancer ([address]     left idc 1+ node    Cancer ([address] 2009    breast cancer     Past Surgical History:   Procedure Laterality Date    HX ACL RECONSTRUCTION Right 2002    HX BREAST LUMPECTOMY Left 2010    idc 1 + node, chemo only    HX COLONOSCOPY  2014    normal    LUIS ALBERTO BIOPSY BREAST STEREOTACTIC Left 2010    idc      Family History   Problem Relation Age of Onset    Cancer Mother         melanoma    Lupus Father      Social History     Tobacco Use    Smoking status: Never Smoker    Smokeless tobacco: Never Used   Substance Use Topics    Alcohol use: No             Specific concerns today: recent flare of iritis. Seen by Dr Celedonio Kanner at [facility]. Labs showed + HLA b27. She denies any joint problems or back pain. Also facial rosacea. Also daytime fatigue. Wakes up tired even after getting enough sleep. Wishes R/O GATO.     Review of Systems  Pertinent items are noted in HPI.    Objective:   Blood pressure 90/60, pulse (!) 54, temperature 98.4 °F (36.9 °C), temperature source Oral, resp. rate 20, height 5' 6\" (1.676 m), weight 144 lb 9.6 oz (65.6 kg), SpO2 98 %. Physical Examination:   General appearance - alert, well appearing, and in no distress and normal appearing weight  Mental status - alert, oriented to person, place, and time  Eyes - pupils equal and reactive, extraocular eye movements intact  Ears - bilateral TM's and external ear canals normal  Nose - normal and patent, no erythema, discharge or polyps  Mouth - mucous membranes moist, pharynx normal without lesions  Neck - supple, no significant adenopathy  Chest - clear to auscultation, no wheezes, rales or rhonchi, symmetric air entry  Heart - normal rate, regular rhythm, normal S1, S2, no murmurs, rubs, clicks or gallops  Abdomen - soft, nontender, nondistended, no masses or organomegaly  Musculoskeletal - no joint tenderness, deformity or swelling  Extremities - peripheral pulses normal, no pedal edema, no clubbing or cyanosis     Assessment/Plan:   Well woman  follow low fat diet, follow low salt diet, routine labs ordered    ICD-10-CM ICD-9-CM    1. Routine adult health maintenance Z00.00 V70.0 CBC WITH AUTOMATED DIFF      METABOLIC PANEL, COMPREHENSIVE      LIPID PANEL      TSH 3RD GENERATION      HEMOGLOBIN A1C WITH EAG   2. Iritis, chronic H20.10 364.10    3. Vitamin D deficiency E55.9 268.9 VITAMIN D, 25 HYDROXY   4. Vitamin B 12 deficiency E53.8 266.2 VITAMIN B12 & FOLATE   5. Encounter for hepatitis C screening test for low risk patient Z11.59 V73.89 HEPATITIS C AB   6. Well woman exam (no gynecological exam) Z00.00 V70.0     [V70.0]   7. Rosacea L71.9 695.3 metroNIDAZOLE (METROGEL) 0.75 % topical gel   8.  Daytime somnolence R40.0 780.54 REFERRAL TO SLEEP STUDIES

## 2019-08-19 NOTE — PATIENT INSTRUCTIONS
Well Visit, Ages 25 to 48: Care Instructions  Your Care Instructions    Physical exams can help you stay healthy. Your doctor has checked your overall health and may have suggested ways to take good care of yourself. He or she also may have recommended tests. At home, you can help prevent illness with healthy eating, regular exercise, and other steps. Follow-up care is a key part of your treatment and safety. Be sure to make and go to all appointments, and call your doctor if you are having problems. It's also a good idea to know your test results and keep a list of the medicines you take. How can you care for yourself at home? · Reach and stay at a healthy weight. This will lower your risk for many problems, such as obesity, diabetes, heart disease, and high blood pressure. · Get at least 30 minutes of physical activity on most days of the week. Walking is a good choice. You also may want to do other activities, such as running, swimming, cycling, or playing tennis or team sports. Discuss any changes in your exercise program with your doctor. · Do not smoke or allow others to smoke around you. If you need help quitting, talk to your doctor about stop-smoking programs and medicines. These can increase your chances of quitting for good. · Talk to your doctor about whether you have any risk factors for sexually transmitted infections (STIs). Having one sex partner (who does not have STIs and does not have sex with anyone else) is a good way to avoid these infections. · Use birth control if you do not want to have children at this time. Talk with your doctor about the choices available and what might be best for you. · Protect your skin from too much sun. When you're outdoors from 10 a.m. to 4 p.m., stay in the shade or cover up with clothing and a hat with a wide brim. Wear sunglasses that block UV rays. Even when it's cloudy, put broad-spectrum sunscreen (SPF 30 or higher) on any exposed skin.   · See a dentist one or two times a year for checkups and to have your teeth cleaned. · Wear a seat belt in the car. Follow your doctor's advice about when to have certain tests. These tests can spot problems early. For everyone  · Cholesterol. Have the fat (cholesterol) in your blood tested after age 21. Your doctor will tell you how often to have this done based on your age, family history, or other things that can increase your risk for heart disease. · Blood pressure. Have your blood pressure checked during a routine doctor visit. Your doctor will tell you how often to check your blood pressure based on your age, your blood pressure results, and other factors. · Vision. Talk with your doctor about how often to have a glaucoma test.  · Diabetes. Ask your doctor whether you should have tests for diabetes. · Colon cancer. Your risk for colorectal cancer gets higher as you get older. Some experts say that adults should start regular screening at age 48 and stop at age 76. Others say to start before age 48 or continue after age 76. Talk with your doctor about your risk and when to start and stop screening. For women  · Breast exam and mammogram. Talk to your doctor about when you should have a clinical breast exam and a mammogram. Medical experts differ on whether and how often women under 50 should have these tests. Your doctor can help you decide what is right for you. · Cervical cancer screening test and pelvic exam. Begin with a Pap test at age 24. The test often is part of a pelvic exam. Starting at age 27, you may choose to have a Pap test, an HPV test, or both tests at the same time (called co-testing). Talk with your doctor about how often to have testing. · Tests for sexually transmitted infections (STIs). Ask whether you should have tests for STIs. You may be at risk if you have sex with more than one person, especially if your partners do not wear condoms.   For men  · Tests for sexually transmitted infections (STIs). Ask whether you should have tests for STIs. You may be at risk if you have sex with more than one person, especially if you do not wear a condom. · Testicular cancer exam. Ask your doctor whether you should check your testicles regularly. · Prostate exam. Talk to your doctor about whether you should have a blood test (called a PSA test) for prostate cancer. Experts differ on whether and when men should have this test. Some experts suggest it if you are older than 39 and are -American or have a father or brother who got prostate cancer when he was younger than 72. When should you call for help? Watch closely for changes in your health, and be sure to contact your doctor if you have any problems or symptoms that concern you. Where can you learn more? Go to http://willa-madison.info/. Enter P072 in the search box to learn more about \"Well Visit, Ages 25 to 48: Care Instructions. \"  Current as of: December 13, 2018  Content Version: 12.1  © 0770-7125 Healthwise, Incorporated. Care instructions adapted under license by [employer] (which disclaims liability or warranty for this information). If you have questions about a medical condition or this instruction, always ask your healthcare professional. [place] [address] any warranty or liability for your use of this information.

## 2019-08-19 NOTE — PROGRESS NOTES
Identified pt with two pt identifiers(name and ). Chief Complaint   Patient presents with    Physical    Eye Problem     iris inflammation - Dr Ganga Kaplan. he did lab and this HLAB27     Stress     Her mother passed         Health Maintenance Due   Topic    Hepatitis C Screening     Shingrix Vaccine Age 50> (1 of 2)    FOBT Q 1 YEAR AGE 50-75        Wt Readings from Last 3 Encounters:   19 144 lb 9.6 oz (65.6 kg)   19 144 lb 12.8 oz (65.7 kg)   18 143 lb 6.4 oz (65 kg)     Temp Readings from Last 3 Encounters:   19 98.4 °F (36.9 °C) (Oral)   19 98.6 °F (37 °C) (Oral)   18 98.5 °F (36.9 °C) (Oral)     BP Readings from Last 3 Encounters:   19 90/60   19 113/76   18 106/62     Pulse Readings from Last 3 Encounters:   19 (!) 54   19 62   18 73         Learning Assessment:  :     Learning Assessment 2015   PRIMARY LEARNER Patient   HIGHEST LEVEL OF EDUCATION - PRIMARY LEARNER  > 4 YEARS OF COLLEGE   BARRIERS PRIMARY LEARNER NONE   CO-LEARNER CAREGIVER No   PRIMARY LANGUAGE ENGLISH   LEARNER PREFERENCE PRIMARY READING     LISTENING   ANSWERED BY self   RELATIONSHIP SELF       Depression Screening:  :     3 most recent PHQ Screens 2019   Little interest or pleasure in doing things Not at all   Feeling down, depressed, irritable, or hopeless Not at all   Total Score PHQ 2 0       Fall Risk Assessment:  :     Fall Risk Assessment, last 12 mths 2017   Able to walk? Yes   Fall in past 12 months? No       Abuse Screening:  :     Abuse Screening Questionnaire 2019   Do you ever feel afraid of your partner? N N N   Are you in a relationship with someone who physically or mentally threatens you? N N N   Is it safe for you to go home?  Y Y Y       Coordination of Care Questionnaire:  :     1) Have you been to an emergency room, urgent care clinic since your last visit? no   Hospitalized since your last visit? no 2) Have you seen or consulted any other health care providers outside of [address] [place] [address] since your last visit? yes  Dr Charlee Harman and oncologist (Include any pap smears or colon screenings in this section.)    3) Do you have an Advance Directive on file? no  Are you interested in receiving information about Advance Directives? no    Reviewed record in preparation for visit and have obtained necessary documentation. Medication reconciliation up to date and corrected with patient at this time.

## 2019-08-20 ENCOUNTER — TELEPHONE (OUTPATIENT)
Dept: INFUSION THERAPY | Age: 59
End: 2019-08-20

## 2019-08-20 LAB
25(OH)D3+25(OH)D2 SERPL-MCNC: 40.7 NG/ML (ref 30–100)
ALBUMIN SERPL-MCNC: 4.4 G/DL (ref 3.5–5.5)
ALBUMIN/GLOB SERPL: 1.6 {RATIO} (ref 1.2–2.2)
ALP SERPL-CCNC: 65 IU/L (ref 39–117)
ALT SERPL-CCNC: 13 IU/L (ref 0–32)
AST SERPL-CCNC: 23 IU/L (ref 0–40)
BASOPHILS # BLD AUTO: 0 X10E3/UL (ref 0–0.2)
BASOPHILS NFR BLD AUTO: 1 %
BILIRUB SERPL-MCNC: 0.7 MG/DL (ref 0–1.2)
BUN SERPL-MCNC: 11 MG/DL (ref 6–24)
BUN/CREAT SERPL: 15 (ref 9–23)
CALCIUM SERPL-MCNC: 9.2 MG/DL (ref 8.7–10.2)
CHLORIDE SERPL-SCNC: 104 MMOL/L (ref 96–106)
CHOLEST SERPL-MCNC: 208 MG/DL (ref 100–199)
CO2 SERPL-SCNC: 24 MMOL/L (ref 20–29)
CREAT SERPL-MCNC: 0.71 MG/DL (ref 0.57–1)
EOSINOPHIL # BLD AUTO: 0.1 X10E3/UL (ref 0–0.4)
EOSINOPHIL NFR BLD AUTO: 3 %
ERYTHROCYTE [DISTWIDTH] IN BLOOD BY AUTOMATED COUNT: 13 % (ref 12.3–15.4)
EST. AVERAGE GLUCOSE BLD GHB EST-MCNC: 100 MG/DL
FOLATE SERPL-MCNC: >20 NG/ML
GLOBULIN SER CALC-MCNC: 2.7 G/DL (ref 1.5–4.5)
GLUCOSE SERPL-MCNC: 69 MG/DL (ref 65–99)
HBA1C MFR BLD: 5.1 % (ref 4.8–5.6)
HCT VFR BLD AUTO: 40.3 % (ref 34–46.6)
HCV AB S/CO SERPL IA: <0.1 S/CO RATIO (ref 0–0.9)
HDLC SERPL-MCNC: 57 MG/DL
HGB BLD-MCNC: 13.1 G/DL (ref 11.1–15.9)
IMM GRANULOCYTES # BLD AUTO: 0 X10E3/UL (ref 0–0.1)
IMM GRANULOCYTES NFR BLD AUTO: 0 %
INTERPRETATION, 910389: NORMAL
LDLC SERPL CALC-MCNC: 130 MG/DL (ref 0–99)
LYMPHOCYTES # BLD AUTO: 1.2 X10E3/UL (ref 0.7–3.1)
LYMPHOCYTES NFR BLD AUTO: 32 %
MCH RBC QN AUTO: 30.7 PG (ref 26.6–33)
MCHC RBC AUTO-ENTMCNC: 32.5 G/DL (ref 31.5–35.7)
MCV RBC AUTO: 94 FL (ref 79–97)
MONOCYTES # BLD AUTO: 0.4 X10E3/UL (ref 0.1–0.9)
MONOCYTES NFR BLD AUTO: 10 %
NEUTROPHILS # BLD AUTO: 2.1 X10E3/UL (ref 1.4–7)
NEUTROPHILS NFR BLD AUTO: 54 %
PLATELET # BLD AUTO: 291 X10E3/UL (ref 150–450)
POTASSIUM SERPL-SCNC: 4.1 MMOL/L (ref 3.5–5.2)
PROT SERPL-MCNC: 7.1 G/DL (ref 6–8.5)
RBC # BLD AUTO: 4.27 X10E6/UL (ref 3.77–5.28)
SODIUM SERPL-SCNC: 141 MMOL/L (ref 134–144)
TRIGL SERPL-MCNC: 107 MG/DL (ref 0–149)
TSH SERPL DL<=0.005 MIU/L-ACNC: 1.21 UIU/ML (ref 0.45–4.5)
VIT B12 SERPL-MCNC: 447 PG/ML (ref 232–1245)
VLDLC SERPL CALC-MCNC: 21 MG/DL (ref 5–40)
WBC # BLD AUTO: 3.9 X10E3/UL (ref 3.4–10.8)

## 2019-08-20 NOTE — TELEPHONE ENCOUNTER
Patient called and stated she needs a prescription for a wig. She would like a callback.     [phone]

## 2019-08-23 DIAGNOSIS — T45.1X5A ALOPECIA DUE TO CYTOTOXIC DRUG: Primary | ICD-10-CM

## 2019-08-23 DIAGNOSIS — L65.8 ALOPECIA DUE TO CYTOTOXIC DRUG: Primary | ICD-10-CM

## 2019-08-23 DIAGNOSIS — Z98.890 HISTORY OF LUMPECTOMY OF LEFT BREAST: ICD-10-CM

## 2019-08-23 DIAGNOSIS — Z85.3 HISTORY OF BREAST CANCER: ICD-10-CM

## 2019-08-23 NOTE — TELEPHONE ENCOUNTER
Called the patient and verified ID x 2. Informed the patient that Jostin Whitten NP placed and signed a prescription for a wig. Asked where this office can fax the prescription to. The patient requested that the prescription be sent to [facility] and mailed to her. Informed the patient that this office will fax the prescription to [facility] and mail it to her. The patient verbalized understanding and denied any questions or concerns. 8/23/19 at 16:48 - Prescription faxed and fax confirmation received.

## 2019-10-04 ENCOUNTER — TELEPHONE (OUTPATIENT)
Dept: FAMILY MEDICINE CLINIC | Age: 59
End: 2019-10-04

## 2019-10-04 DIAGNOSIS — L71.9 ROSACEA: ICD-10-CM

## 2019-10-04 RX ORDER — METRONIDAZOLE 7.5 MG/G
GEL TOPICAL 2 TIMES DAILY
Qty: 60 G | Refills: 3 | Status: SHIPPED | OUTPATIENT
Start: 2019-10-04

## 2019-10-04 NOTE — TELEPHONE ENCOUNTER
Pt called requesting gel medication that was used for Rosacea, does not remember name. Pt uses [address] on file.

## 2019-12-30 ENCOUNTER — TELEPHONE (OUTPATIENT)
Dept: FAMILY MEDICINE CLINIC | Age: 59
End: 2019-12-30

## 2019-12-30 DIAGNOSIS — R07.89 CHEST DISCOMFORT: Primary | ICD-10-CM

## 2019-12-30 NOTE — TELEPHONE ENCOUNTER
----- Message from Paco Dougherty sent at 12/30/2019 10:48 AM EST -----  Regarding: Dr Matthews/ telephone  Contact: [phone]  Caller's first and last name: Pt  Reason for call: pt would like for the physician to recommend a cardiologist for the tightness in the pt chest.  Callback required yes/no and why: yes  Best contact number(s): [phone]  Details to clarify the request: n/a

## 2020-01-07 ENCOUNTER — OFFICE VISIT (OUTPATIENT)
Dept: SLEEP MEDICINE | Age: 60
End: 2020-01-07

## 2020-01-07 VITALS
HEIGHT: 66 IN | OXYGEN SATURATION: 98 % | WEIGHT: 148.1 LBS | BODY MASS INDEX: 23.8 KG/M2 | HEART RATE: 73 BPM | TEMPERATURE: 98.3 F | SYSTOLIC BLOOD PRESSURE: 95 MMHG | DIASTOLIC BLOOD PRESSURE: 57 MMHG

## 2020-01-07 DIAGNOSIS — G47.30 HYPERSOMNIA WITH SLEEP APNEA: Primary | ICD-10-CM

## 2020-01-07 DIAGNOSIS — G47.10 HYPERSOMNIA WITH SLEEP APNEA: Primary | ICD-10-CM

## 2020-01-07 NOTE — PATIENT INSTRUCTIONS
217 [facility] Freddy. [place], [address]  Tel.  [phone]  Fax. 100 [facility] 60  [place], [address] [facility]  Tel.  [phone]  Fax. [phone] [address]  Tel.  [phone]  Fax. [phone]     Sleep Apnea: After Your Visit  Your Care Instructions  Sleep apnea occurs when you frequently stop breathing for 10 seconds or longer during sleep. It can be mild to severe, based on the number of times per hour that you stop breathing or have slowed breathing. Blocked or narrowed airways in your nose, mouth, or throat can cause sleep apnea. Your airway can become blocked when your throat muscles and tongue relax during sleep. Sleep apnea is common, occurring in 1 out of 20 individuals. Individuals having any of the following characteristics should be evaluated and treated right away due to high risk and detrimental consequences from untreated sleep apnea:  1. Obesity  2. Congestive Heart failure  3. Atrial Fibrillation  4. Uncontrolled Hypertension  5. Type II Diabetes  6. Night-time Arrhythmias  7. Stroke  8. Pulmonary Hypertension  9. High-risk Driving Populations (pilots, truck drivers, etc.)  10. Patients Considering Weight-loss Surgery    How do you know you have sleep apnea? You probably have sleep apnea if you answer 'yes' to 3 or more of the following questions:  S - Have you been told that you Snore? T - Are you often Tired during the day? O - Has anyone Observed you stop breathing while sleeping? P- Do you have (or are being treated for) high blood Pressure? B - Are you obese (Body Mass Index > 35)? A - Is your Age 48years old or older? N - Is your Neck size greater than 16 inches? G - Are you male Gender? A sleep physician can prescribe a breathing device that prevents tissues in the throat from blocking your airway.  Or your doctor may recommend using a dental device (oral breathing device) to help keep your airway open. In some cases, surgery may be needed to remove enlarged tissues in the throat. Follow-up care is a key part of your treatment and safety. Be sure to make and go to all appointments, and call your doctor if you are having problems. It's also a good idea to know your test results and keep a list of the medicines you take. How can you care for yourself at home? · Lose weight, if needed. It may reduce the number of times you stop breathing or have slowed breathing. · Go to bed at the same time every night. · Sleep on your side. It may stop mild apnea. If you tend to roll onto your back, sew a pocket in the back of your pajama top. Put a tennis ball into the pocket, and stitch the pocket shut. This will help keep you from sleeping on your back. · Avoid alcohol and medicines such as sleeping pills and sedatives before bed. · Do not smoke. Smoking can make sleep apnea worse. If you need help quitting, talk to your doctor about stop-smoking programs and medicines. These can increase your chances of quitting for good. · Prop up the head of your bed 4 to 6 inches by putting bricks under the legs of the bed. · Treat breathing problems, such as a stuffy nose, caused by a cold or allergies. · Use a continuous positive airway pressure (CPAP) breathing machine if lifestyle changes do not help your apnea and your doctor recommends it. The machine keeps your airway from closing when you sleep. · If CPAP does not help you, ask your doctor whether you should try other breathing machines. A bilevel positive airway pressure machine has two types of air pressureâone for breathing in and one for breathing out. Another device raises or lowers air pressure as needed while you breathe. · If your nose feels dry or bleeds when using one of these machines, talk with your doctor about increasing moisture in the air. A humidifier may help.   · If your nose is runny or stuffy from using a breathing machine, talk with your doctor about using decongestants or a corticosteroid nasal spray. When should you call for help? Watch closely for changes in your health, and be sure to contact your doctor if:  · You still have sleep apnea even though you have made lifestyle changes. · You are thinking of trying a device such as CPAP. · You are having problems using a CPAP or similar machine. Where can you learn more? Go to [employer]. Enter B317 in the search box to learn more about \"Sleep Apnea: After Your Visit. \"   © 4709-3089 Healthwise, Incorporated. Care instructions adapted under license by [facility] "[employer] (which disclaims liability or warranty for this information). This care instruction is for use with your licensed healthcare professional. If you have questions about a medical condition or this instruction, always ask your healthcare professional. Raymond Messina any warranty or liability for your use of this information. PROPER SLEEP HYGIENE    What to avoid  · Do not have drinks with caffeine, such as coffee or black tea, for 8 hours before bed. · Do not smoke or use other types of tobacco near bedtime. Nicotine is a stimulant and can keep you awake. · Avoid drinking alcohol late in the evening, because it can cause you to wake in the middle of the night. · Do not eat a big meal close to bedtime. If you are hungry, eat a light snack. · Do not drink a lot of water close to bedtime, because the need to urinate may wake you up during the night. · Do not read or watch TV in bed. Use the bed only for sleeping and sexual activity. What to try  · Go to bed at the same time every night, and wake up at the same time every morning. Do not take naps during the day. · Keep your bedroom quiet, dark, and cool. · Get regular exercise, but not within 3 to 4 hours of your bedtime. .  · Sleep on a comfortable pillow and mattress.   · If watching the clock makes you anxious, turn it facing away from you so you cannot see the time. · If you worry when you lie down, start a worry book. Well before bedtime, write down your worries, and then set the book and your concerns aside. · Try meditation or other relaxation techniques before you go to bed. · If you cannot fall asleep, get up and go to another room until you feel sleepy. Do something relaxing. Repeat your bedtime routine before you go to bed again. · Make your house quiet and calm about an hour before bedtime. Turn down the lights, turn off the TV, log off the computer, and turn down the volume on music. This can help you relax after a busy day. Drowsy Driving  The [address] [place] [address] Road Traffic Safety Administration cites drowsiness as a causing factor in more than 445,308 police reported crashes annually, resulting in 76,000 injuries and 1,500 deaths. Other surveys suggest 55% of people polled have driven while drowsy in the past year, 23% had fallen asleep but not crashed, 3% crashed, and 2% had and accident due to drowsy driving. Who is at risk? Young Drivers: One study of drowsy driving accidents states that 55% of the drivers were under 25 years. Of those, 75% were male. Shift Workers and Travelers: People who work overnight or travel across time zones frequently are at higher risk of experiencing Circadian Rhythm Disorders. They are trying to work and function when their body is programed to sleep. Sleep Deprived: Lack of sleep has a serious impact on your ability to pay attention or focus on a task. Consistently getting less than the average of 8 hours your body needs creates partial or cumulative sleep deprivation. Untreated Sleep Disorders: Sleep Apnea, Narcolepsy, R.L.S., and other sleep disorders (untreated) prevent a person from getting enough restful sleep. This leads to excessive daytime sleepiness and increases the risk for drowsy driving accidents by up to 7 times.   Medications / Alcohol: Even over the counter medications can cause drowsiness. Medications that impair a drivers attention should have a warning label. Alcohol naturally makes you sleepy and on its own can cause accidents. Combined with excessive drowsiness its effects are amplified. Signs of Drowsy Driving:   * You don't remember driving the last few miles   * You may drift out of your marie   * You are unable to focus and your thoughts wander   * You may yawn more often than normal   * You have difficulty keeping your eyes open / nodding off   * Missing traffic signs, speeding, or tailgating  Prevention-   Good sleep hygiene, lifestyle and behavioral choices have the most impact on drowsy driving. There is no substitute for sleep and the average person requires 8 hours nightly. If you find yourself driving drowsy, stop and sleep. Consider the sleep hygiene tips provided during your visit as well. Medication Refill Policy: Refills for all medications require 1 week advance notice. Please have your pharmacy fax a refill request. We are unable to fax, or call in \"controled substance\" medications and you will need to pick these prescriptions up from our office. [employer] Activation    Thank you for requesting access to [employer]. Please follow the instructions below to securely access and download your online medical record. [employer] allows you to send messages to your doctor, view your test results, renew your prescriptions, schedule appointments, and more. How Do I Sign Up? 1. In your internet browser, go to [email or web address]. 2. Click on the First Time User? Click Here link in the Sign In box. You will see the New Member Sign Up page. 3. Enter your [employer] Access Code exactly as it appears below. You will not need to use this code after youve completed the sign-up process. If you do not sign up before the expiration date, you must request a new code.     [employer] Access Code: [email or web address]  Expires: 2/21/2020  4:31 PM (This is the date your [facility] access code will )    4. Enter the last four digits of your Social Security Number (xxxx) and Date of Birth (mm/dd/yyyy) as indicated and click Submit. You will be taken to the next sign-up page. 5. Create a [employer] ID. This will be your [facility] login ID and cannot be changed, so think of one that is secure and easy to remember. 6. Create a [facility] password. You can change your password at any time. 7. Enter your Password Reset Question and Answer. This can be used at a later time if you forget your password. 8. Enter your e-mail address. You will receive e-mail notification when new information is available in [address] 9. Click Sign Up. You can now view and download portions of your medical record. 10. Click the Download Summary menu link to download a portable copy of your medical information. Additional Information    If you have questions, please call [phone]. Remember, [facility] is NOT to be used for urgent needs. For medical emergencies, dial 911.

## 2020-01-07 NOTE — PROGRESS NOTES
217 [facility] Fred[place]. [place], [address]  Tel.  [phone]  Fax. 100 [facility] 60  [place], [address] [facility]  Tel.  [phone]  Fax. [phone] 9[address] [facility] Ghassan Majano   Tel.  [phone]  Fax. [phone]         Subjective:      Kyler Blackman is an 64 y.o. female referred for evaluation for a sleep disorder. She complains of snoring associated with snorting, awakening in the middle of the night because of urination. Symptoms began several years ago, unchanged since that time. She usually can fall asleep in 5 minutes. Family or house members note snoring. She denies completely or partially paralyzed while falling asleep or waking up. Kyler Blackman does not wake up frequently at night. She is not bothered by waking up too early and left unable to get back to sleep. She actually sleeps about 8 hours at night and wakes up about 3 times during the night. She does not work shifts: Mariah Salvador indicates she does get too little sleep at night. Her bedtime is 2300. She awakens at 0600. She does not take naps. She has the following observed behaviors: Loud snoring, Light snoring, Grinding teeth;  . Other remarks: waking with gasp or snort     Worth Sleepiness Score: 10 which reflect moderate daytime drowsiness. Allergies   Allergen Reactions    Latex Rash    Pcn [Penicillins] Rash    Stadol [Butorphanol Tartrate] Other (comments)     Sees things    Sterapred [Prednisone] Other (comments)     depression    Sulfa (Sulfonamide Antibiotics) Other (comments)     septic         Current Outpatient Medications:     CRANIAL PROSTHESIS misc, Wig for chemo/ breast cancer, Disp: 1 Each, Rfl: 0    multivitamin (ONE A DAY) tablet, Take 1 Tab by mouth daily. , Disp: , Rfl:     OTHER, Indications: Tucker-Mag Plus, Disp: , Rfl:     metroNIDAZOLE (METROGEL) 0.75 % topical gel, Apply  to affected area two (2) times a day., Disp: 60 g, Rfl: 3   cholecalciferol, vitamin D3, (VITAMIN D3) 4,000 unit cap, Take  by mouth., Disp: , Rfl:     OTHER, Take  by mouth daily. Indications: EPAmax - fish oil, Disp: , Rfl:      She  has a past medical history of Acute iritis of left eye, Breast cancer ([facility] [address] [address] and Cancer ([facility] (2009). She  has a past surgical history that includes hx acl reconstruction (Right, 2002); hx colonoscopy (2014); michelle biopsy breast stereotactic (Left, 2010); and hx breast lumpectomy (Left, 2010). She family history includes Cancer in her mother; Lupus in her father. She  reports that she has never smoked. She has never used smokeless tobacco. She reports that she does not drink alcohol or use drugs. Review of Systems:  Constitutional:  No significant weight loss or weight gain  Eyes:  No blurred vision  CVS:  No significant chest pain  Pulm:  No significant shortness of breath  GI:  No significant nausea or vomiting  :  + significant nocturia  Musculoskeletal:  No significant joint pain at night  Skin:  No significant rashes  Neuro:  No significant dizziness   Psych:  No active mood issues    Sleep Review of Systems: notable for no difficulty falling asleep; infrequent awakenings at night;  regular dreaming noted; no nightmares ; no early morning headaches; no memory problems; no concentration issues; no history of any automobile or occupational accidents due to daytime drowsiness.       Objective:     Visit Vitals  BP 95/57 (BP 1 Location: Right arm, BP Patient Position: Sitting)   Pulse 73   Temp 98.3 °F (36.8 °C)   Ht 5' 6\" (1.676 m)   Wt 148 lb 1.6 oz (67.2 kg)   SpO2 98%   BMI 23.90 kg/m²         General:   Not in acute distress   Eyes:  Anicteric sclerae, no obvious strabismus   Nose:  No obvious nasal septum deviation    Oropharynx:   Class 4 oropharyngeal outlet, thick tongue base, uvula could not be seen due to low-lying soft palate, narrow tonsilo-pharyngeal pilars   Tonsils:   tonsils are not seen due to low-lying soft palate   Neck:   Neck circ. in \"inches\": 14; midline trachea   Chest/Lungs:  Equal lung expansion, clear on auscultation    CVS:  Normal rate, regular rhythm; no JVD   Skin:  Warm to touch; no obvious rashes   Neuro:  No focal deficits ; no obvious tremor    Psych:  Normal affect,  normal countenance;          Assessment:       ICD-10-CM ICD-9-CM    1. Hypersomnia with sleep apnea G47.10 780.53 SLEEP STUDY UNATTENDED, 4 CHANNEL    G47.30     2. BMI 23.0-23.9, adult Z68.23 V85.1          Plan:     * The patient currently has a Low Risk for having sleep apnea. STOP-BANG score 3.  * Sleep testing was ordered for initial evaluation. * She was provided information on sleep apnea including coresponding risk factors and the importance of proper treatment. * Treatment options if indicated were reviewed today. Patient agrees to a trial of PAP therapy if indicated. * Counseling was provided regarding proper sleep hygiene (including effect of light on sleep) sleep environment safety and safe driving. * Patient agrees to telephone [phone]  follow-up by myself or lead sleep technologist shortly after sleep study to review results and plan final management.     (patient has given permission for a message to be left regarding test results and further management if patient cannot be cannot be reached directly). Thank you for allowing us to participate in your patient's medical care. We'll keep you updated on these investigations. Gita Barajas MD, FAASM  Electronically signed.  01/07/20

## 2020-01-09 ENCOUNTER — TELEPHONE (OUTPATIENT)
Dept: SLEEP MEDICINE | Age: 60
End: 2020-01-09

## 2020-01-09 ENCOUNTER — HOSPITAL ENCOUNTER (OUTPATIENT)
Dept: MAMMOGRAPHY | Age: 60
Discharge: HOME | End: 2020-01-09
Attending: INTERNAL MEDICINE

## 2020-01-09 ENCOUNTER — TELEPHONE (OUTPATIENT)
Dept: ONCOLOGY | Age: 60
End: 2020-01-09

## 2020-01-09 DIAGNOSIS — Z12.31 VISIT FOR SCREENING MAMMOGRAM: ICD-10-CM

## 2020-01-09 DIAGNOSIS — R92.8 ABNORMAL MAMMOGRAM: Primary | ICD-10-CM

## 2020-01-09 DIAGNOSIS — G47.33 OSA (OBSTRUCTIVE SLEEP APNEA): Primary | ICD-10-CM

## 2020-01-09 NOTE — TELEPHONE ENCOUNTER
Soraida Rivera is to be contacted by lead sleep technologist regarding results of Sleep Testing which was indicative of an average AHI of 7.6 per hour with an SpO2 laurita of 88% and SpO2 of < 88% being 0.1 minutes. An APAP prescription has been written and patient will be contacted by office staff regarding follow-up  in 2-3 months after initiation of therapy. Encounter Diagnosis   Name Primary?  GATO (obstructive sleep apnea) Yes       Orders Placed This Encounter    AMB SUPPLY ORDER     Diagnosis: (G47.33) GATO (obstructive sleep apnea)  (primary encounter diagnosis)     Respironics DreamStation ( Unit - Auto Mode) / Heated Humidifier :    Positive Airway Pressure Therapy: Duration of need: 99 months. Auto - PAP: 4 - 20 cmH2O; Optistart enabled. Ramp Time: 30 Minutes; Flex: 2. Remote monitoring enrollment.  Nasal Cushion (Replace) 2 per month.  Nasal Interface Mask 1 every 3 months.  Headgear 1 every 6 months.  Filter(s) Disposable 2 per month.  Filter(s) Non-Disposable 1 every 6 months. [address] [address] for Lockbenjamined Gera (Replace) 1 every 6 months.  Tubing with heating element 1 every 3 months. Perform Mask Fitting per patient preference and comfort - replace as above. Shawanda Patino MD, FAA; NPI: [id number]  Electronically signed. 01/09/20

## 2020-01-09 NOTE — TELEPHONE ENCOUNTER
Patient called and stated that she had a mammogram and they suggested she have a ultrasound on her left breast. Patient stated that she would like to know if an order can be sent to [phone] fax ([address]).  # [phone]

## 2020-01-09 NOTE — TELEPHONE ENCOUNTER
Reviewed sleep study results with patient. She expressed understanding and is willing to proceed with a trial of APAP. Fax DME order & Schedule 1st adherence visit in 60 to 90 days.      Zhanna Juarez,RRT,RPSGT, CSE

## 2020-01-10 ENCOUNTER — DOCUMENTATION ONLY (OUTPATIENT)
Dept: SLEEP MEDICINE | Age: 60
End: 2020-01-10

## 2020-01-10 NOTE — TELEPHONE ENCOUNTER
Call to patient, left TONIA on self identifying phone line. Orders for breast U/S written by provider, faxed to [address] with confirmation received.

## 2020-01-24 ENCOUNTER — DOCUMENTATION ONLY (OUTPATIENT)
Dept: ONCOLOGY | Age: 60
End: 2020-01-24

## 2020-01-24 ENCOUNTER — TELEPHONE (OUTPATIENT)
Dept: ONCOLOGY | Age: 60
End: 2020-01-24

## 2020-01-24 DIAGNOSIS — R92.8 ABNORMAL MAMMOGRAM: ICD-10-CM

## 2020-01-24 DIAGNOSIS — Z98.890 HISTORY OF LUMPECTOMY OF LEFT BREAST: ICD-10-CM

## 2020-01-24 DIAGNOSIS — Z85.3 HISTORY OF BREAST CANCER: Primary | ICD-10-CM

## 2020-01-24 NOTE — TELEPHONE ENCOUNTER
Call to patient, left VM on self identifying line. Orders are placed, does she want faxed to Ver[facility].

## 2020-01-24 NOTE — TELEPHONE ENCOUNTER
Call to patient, left VM on self identifying VM to contact RN for diagnostic mammogram order clarification.

## 2020-01-24 NOTE — TELEPHONE ENCOUNTER
Patient returned RN call and left message with PSR ABDIRIZAK Hodges that she needed order for bilateral diagnostic mammogram.

## 2020-01-27 ENCOUNTER — APPOINTMENT (OUTPATIENT)
Dept: MAMMOGRAPHY | Age: 60
End: 2020-01-27
Attending: INTERNAL MEDICINE
Payer: COMMERCIAL

## 2020-01-27 ENCOUNTER — HOSPITAL ENCOUNTER (OUTPATIENT)
Dept: MAMMOGRAPHY | Age: 60
Discharge: HOME | End: 2020-01-27
Attending: INTERNAL MEDICINE
Payer: COMMERCIAL

## 2020-01-27 ENCOUNTER — HOSPITAL ENCOUNTER (OUTPATIENT)
Dept: MAMMOGRAPHY | Age: 60
Discharge: HOME | End: 2020-01-27
Attending: NURSE PRACTITIONER
Payer: COMMERCIAL

## 2020-01-27 DIAGNOSIS — Z98.890 HISTORY OF LUMPECTOMY OF LEFT BREAST: ICD-10-CM

## 2020-01-27 DIAGNOSIS — Z85.3 HISTORY OF BREAST CANCER: ICD-10-CM

## 2020-01-27 DIAGNOSIS — Z85.3 HX OF BREAST CANCER: ICD-10-CM

## 2020-01-27 PROCEDURE — 76882 US LMTD JT/FCL EVL NVASC XTR: CPT

## 2020-01-27 PROCEDURE — 77062 BREAST TOMOSYNTHESIS BI: CPT

## 2020-03-10 ENCOUNTER — TELEPHONE (OUTPATIENT)
Dept: SLEEP MEDICINE | Age: 60
End: 2020-03-10

## 2020-03-10 NOTE — TELEPHONE ENCOUNTER
CATRACHITAM requesting a return call to reschedule her adherence visit with Dafne Smith NP due to scheduling conflict.

## 2020-03-25 ENCOUNTER — DOCUMENTATION ONLY (OUTPATIENT)
Dept: SLEEP MEDICINE | Age: 60
End: 2020-03-25

## 2020-03-25 NOTE — PROGRESS NOTES
Patient was not set up by [facility] on PAP machine. [facility] was unable to reach patient and cancelled order in February. LM with patient regarding this and her follow up visit that was scheduled for 3/26/20.

## 2020-12-01 ENCOUNTER — TRANSCRIBE ORDER (OUTPATIENT)
Dept: SCHEDULING | Age: 60
End: 2020-12-01

## 2020-12-01 DIAGNOSIS — Z12.31 VISIT FOR SCREENING MAMMOGRAM: Primary | ICD-10-CM

## 2022-02-28 ENCOUNTER — TELEPHONE (OUTPATIENT)
Dept: FAMILY MEDICINE CLINIC | Age: 62
End: 2022-02-28

## 2022-03-19 PROBLEM — Z15.89 HLA B27 POSITIVE: Status: ACTIVE | Noted: 2019-08-19

## 2023-05-26 RX ORDER — METRONIDAZOLE 7.5 MG/G
GEL TOPICAL 2 TIMES DAILY
COMMUNITY
Start: 2019-10-04